# Patient Record
Sex: FEMALE | Race: WHITE | URBAN - NONMETROPOLITAN AREA
[De-identification: names, ages, dates, MRNs, and addresses within clinical notes are randomized per-mention and may not be internally consistent; named-entity substitution may affect disease eponyms.]

---

## 2021-07-20 ENCOUNTER — APPOINTMENT (OUTPATIENT)
Dept: CT IMAGING | Age: 56
End: 2021-07-20
Payer: COMMERCIAL

## 2021-07-20 ENCOUNTER — HOSPITAL ENCOUNTER (EMERGENCY)
Age: 56
Discharge: ANOTHER HOSPITAL | End: 2021-07-20
Attending: EMERGENCY MEDICINE
Payer: COMMERCIAL

## 2021-07-20 ENCOUNTER — APPOINTMENT (OUTPATIENT)
Dept: GENERAL RADIOLOGY | Age: 56
End: 2021-07-20
Payer: COMMERCIAL

## 2021-07-20 ENCOUNTER — HOSPITAL ENCOUNTER (INPATIENT)
Age: 56
LOS: 3 days | Discharge: HOME | DRG: 040 | End: 2021-07-23
Attending: INTERNAL MEDICINE | Admitting: STUDENT IN AN ORGANIZED HEALTH CARE EDUCATION/TRAINING PROGRAM
Payer: COMMERCIAL

## 2021-07-20 VITALS
HEART RATE: 82 BPM | SYSTOLIC BLOOD PRESSURE: 104 MMHG | RESPIRATION RATE: 14 BRPM | TEMPERATURE: 98.9 F | DIASTOLIC BLOOD PRESSURE: 75 MMHG | OXYGEN SATURATION: 95 %

## 2021-07-20 DIAGNOSIS — R41.82 ALTERED MENTAL STATUS, UNSPECIFIED ALTERED MENTAL STATUS TYPE: Primary | ICD-10-CM

## 2021-07-20 DIAGNOSIS — R09.02 HYPOXIA: ICD-10-CM

## 2021-07-20 DIAGNOSIS — R77.8 ELEVATED TROPONIN: ICD-10-CM

## 2021-07-20 PROBLEM — G45.9 TIA (TRANSIENT ISCHEMIC ATTACK): Status: ACTIVE | Noted: 2021-07-20

## 2021-07-20 LAB
ABSOLUTE EOS #: 0 K/UL (ref 0–0.44)
ABSOLUTE IMMATURE GRANULOCYTE: 0 K/UL (ref 0–0.3)
ABSOLUTE LYMPH #: 1.25 K/UL (ref 1.1–3.7)
ABSOLUTE MONO #: 1.63 K/UL (ref 0.1–1.2)
ACETAMINOPHEN LEVEL: <5 UG/ML (ref 10–30)
ANION GAP SERPL CALCULATED.3IONS-SCNC: 12 MMOL/L (ref 9–17)
BASOPHILS # BLD: 0 % (ref 0–2)
BASOPHILS ABSOLUTE: 0 K/UL (ref 0–0.2)
BNP INTERPRETATION: ABNORMAL
BUN BLDV-MCNC: 24 MG/DL (ref 6–20)
BUN/CREAT BLD: 21 (ref 9–20)
CALCIUM SERPL-MCNC: 8.6 MG/DL (ref 8.6–10.4)
CHLORIDE BLD-SCNC: 101 MMOL/L (ref 98–107)
CO2: 26 MMOL/L (ref 20–31)
CREAT SERPL-MCNC: 1.14 MG/DL (ref 0.5–0.9)
DIFFERENTIAL TYPE: ABNORMAL
EOSINOPHILS RELATIVE PERCENT: 0 % (ref 1–4)
ETHANOL PERCENT: <0.01 %
ETHANOL: <10 MG/DL
GFR AFRICAN AMERICAN: 60 ML/MIN
GFR NON-AFRICAN AMERICAN: 49 ML/MIN
GFR SERPL CREATININE-BSD FRML MDRD: ABNORMAL ML/MIN/{1.73_M2}
GFR SERPL CREATININE-BSD FRML MDRD: ABNORMAL ML/MIN/{1.73_M2}
GLUCOSE BLD-MCNC: 129 MG/DL (ref 70–99)
HCT VFR BLD CALC: 38.9 % (ref 36.3–47.1)
HEMOGLOBIN: 12.5 G/DL (ref 11.9–15.1)
IMMATURE GRANULOCYTES: 0 %
INR BLD: 1.2
LACTIC ACID, WHOLE BLOOD: NORMAL MMOL/L (ref 0.7–2.1)
LACTIC ACID: 1.3 MMOL/L (ref 0.5–2.2)
LYMPHOCYTES # BLD: 10 % (ref 24–43)
MCH RBC QN AUTO: 30.2 PG (ref 25.2–33.5)
MCHC RBC AUTO-ENTMCNC: 32.1 G/DL (ref 28.4–34.8)
MCV RBC AUTO: 94 FL (ref 82.6–102.9)
MONOCYTES # BLD: 13 % (ref 3–12)
MORPHOLOGY: NORMAL
NRBC AUTOMATED: 0 PER 100 WBC
PDW BLD-RTO: 13.7 % (ref 11.8–14.4)
PLATELET # BLD: 227 K/UL (ref 138–453)
PLATELET ESTIMATE: ABNORMAL
PMV BLD AUTO: 9.4 FL (ref 8.1–13.5)
POTASSIUM SERPL-SCNC: 4.5 MMOL/L (ref 3.7–5.3)
PRO-BNP: 858 PG/ML
PROCALCITONIN: 0.11 NG/ML
PROTHROMBIN TIME: 15.2 SEC (ref 11.5–14.2)
RBC # BLD: 4.14 M/UL (ref 3.95–5.11)
RBC # BLD: ABNORMAL 10*6/UL
SALICYLATE LEVEL: <1 MG/DL (ref 3–10)
SEG NEUTROPHILS: 77 % (ref 36–65)
SEGMENTED NEUTROPHILS ABSOLUTE COUNT: 9.62 K/UL (ref 1.5–8.1)
SODIUM BLD-SCNC: 139 MMOL/L (ref 135–144)
TOXIC TRICYCLIC SC,BLOOD: NEGATIVE
TROPONIN INTERP: ABNORMAL
TROPONIN INTERP: ABNORMAL
TROPONIN T: ABNORMAL NG/ML
TROPONIN T: ABNORMAL NG/ML
TROPONIN, HIGH SENSITIVITY: 49 NG/L (ref 0–14)
TROPONIN, HIGH SENSITIVITY: 49 NG/L (ref 0–14)
TSH SERPL DL<=0.05 MIU/L-ACNC: 0.9 MIU/L (ref 0.3–5)
WBC # BLD: 12.5 K/UL (ref 3.5–11.3)
WBC # BLD: ABNORMAL 10*3/UL

## 2021-07-20 PROCEDURE — 80048 BASIC METABOLIC PNL TOTAL CA: CPT

## 2021-07-20 PROCEDURE — 80143 DRUG ASSAY ACETAMINOPHEN: CPT

## 2021-07-20 PROCEDURE — 70496 CT ANGIOGRAPHY HEAD: CPT

## 2021-07-20 PROCEDURE — 85025 COMPLETE CBC W/AUTO DIFF WBC: CPT

## 2021-07-20 PROCEDURE — 71260 CT THORAX DX C+: CPT

## 2021-07-20 PROCEDURE — 84443 ASSAY THYROID STIM HORMONE: CPT

## 2021-07-20 PROCEDURE — 83605 ASSAY OF LACTIC ACID: CPT

## 2021-07-20 PROCEDURE — 87040 BLOOD CULTURE FOR BACTERIA: CPT

## 2021-07-20 PROCEDURE — 99283 EMERGENCY DEPT VISIT LOW MDM: CPT

## 2021-07-20 PROCEDURE — 70450 CT HEAD/BRAIN W/O DYE: CPT

## 2021-07-20 PROCEDURE — 6360000004 HC RX CONTRAST MEDICATION: Performed by: EMERGENCY MEDICINE

## 2021-07-20 PROCEDURE — 83880 ASSAY OF NATRIURETIC PEPTIDE: CPT

## 2021-07-20 PROCEDURE — 84145 PROCALCITONIN (PCT): CPT

## 2021-07-20 PROCEDURE — 84484 ASSAY OF TROPONIN QUANT: CPT

## 2021-07-20 PROCEDURE — 36415 COLL VENOUS BLD VENIPUNCTURE: CPT

## 2021-07-20 PROCEDURE — 96374 THER/PROPH/DIAG INJ IV PUSH: CPT

## 2021-07-20 PROCEDURE — 6360000002 HC RX W HCPCS: Performed by: EMERGENCY MEDICINE

## 2021-07-20 PROCEDURE — G0426 INPT/ED TELECONSULT50: HCPCS | Performed by: PSYCHIATRY & NEUROLOGY

## 2021-07-20 PROCEDURE — 80179 DRUG ASSAY SALICYLATE: CPT

## 2021-07-20 PROCEDURE — 80307 DRUG TEST PRSMV CHEM ANLYZR: CPT

## 2021-07-20 PROCEDURE — 71045 X-RAY EXAM CHEST 1 VIEW: CPT

## 2021-07-20 PROCEDURE — 85610 PROTHROMBIN TIME: CPT

## 2021-07-20 PROCEDURE — 2060000000 HC ICU INTERMEDIATE R&B

## 2021-07-20 PROCEDURE — G0480 DRUG TEST DEF 1-7 CLASSES: HCPCS

## 2021-07-20 PROCEDURE — 93005 ELECTROCARDIOGRAM TRACING: CPT | Performed by: EMERGENCY MEDICINE

## 2021-07-20 PROCEDURE — 96375 TX/PRO/DX INJ NEW DRUG ADDON: CPT

## 2021-07-20 PROCEDURE — 2580000003 HC RX 258: Performed by: EMERGENCY MEDICINE

## 2021-07-20 RX ORDER — LANOLIN ALCOHOL/MO/W.PET/CERES
325 CREAM (GRAM) TOPICAL
Status: DISCONTINUED | OUTPATIENT
Start: 2021-07-21 | End: 2021-07-23 | Stop reason: HOSPADM

## 2021-07-20 RX ORDER — FLUTICASONE PROPIONATE 50 MCG
1 SPRAY, SUSPENSION (ML) NASAL DAILY
COMMUNITY

## 2021-07-20 RX ORDER — NALOXONE HYDROCHLORIDE 1 MG/ML
2 INJECTION INTRAMUSCULAR; INTRAVENOUS; SUBCUTANEOUS ONCE
Status: COMPLETED | OUTPATIENT
Start: 2021-07-20 | End: 2021-07-20

## 2021-07-20 RX ORDER — SODIUM CHLORIDE 9 MG/ML
25 INJECTION, SOLUTION INTRAVENOUS PRN
Status: DISCONTINUED | OUTPATIENT
Start: 2021-07-20 | End: 2021-07-23 | Stop reason: HOSPADM

## 2021-07-20 RX ORDER — ONDANSETRON 4 MG/1
4 TABLET, ORALLY DISINTEGRATING ORAL EVERY 8 HOURS PRN
Status: DISCONTINUED | OUTPATIENT
Start: 2021-07-20 | End: 2021-07-23 | Stop reason: HOSPADM

## 2021-07-20 RX ORDER — TRAZODONE HYDROCHLORIDE 100 MG/1
100 TABLET ORAL NIGHTLY
COMMUNITY

## 2021-07-20 RX ORDER — ATORVASTATIN CALCIUM 40 MG/1
40 TABLET, FILM COATED ORAL NIGHTLY
Status: DISCONTINUED | OUTPATIENT
Start: 2021-07-21 | End: 2021-07-23 | Stop reason: HOSPADM

## 2021-07-20 RX ORDER — ONDANSETRON 2 MG/ML
4 INJECTION INTRAMUSCULAR; INTRAVENOUS EVERY 6 HOURS PRN
Status: DISCONTINUED | OUTPATIENT
Start: 2021-07-20 | End: 2021-07-23 | Stop reason: HOSPADM

## 2021-07-20 RX ORDER — CITALOPRAM 10 MG/1
10 TABLET ORAL DAILY
COMMUNITY

## 2021-07-20 RX ORDER — BUSPIRONE HYDROCHLORIDE 30 MG/1
30 TABLET ORAL DAILY
COMMUNITY

## 2021-07-20 RX ORDER — OXYCODONE HYDROCHLORIDE AND ACETAMINOPHEN 5; 325 MG/1; MG/1
1 TABLET ORAL EVERY 4 HOURS PRN
COMMUNITY

## 2021-07-20 RX ORDER — MULTIVIT WITH MINERALS/LUTEIN
250 TABLET ORAL DAILY
Status: DISCONTINUED | OUTPATIENT
Start: 2021-07-21 | End: 2021-07-23 | Stop reason: HOSPADM

## 2021-07-20 RX ORDER — FERROUS SULFATE 325(65) MG
325 TABLET ORAL
COMMUNITY

## 2021-07-20 RX ORDER — MULTIVIT WITH MINERALS/LUTEIN
250 TABLET ORAL DAILY
COMMUNITY

## 2021-07-20 RX ORDER — SODIUM CHLORIDE 0.9 % (FLUSH) 0.9 %
10 SYRINGE (ML) INJECTION PRN
Status: DISCONTINUED | OUTPATIENT
Start: 2021-07-20 | End: 2021-07-23 | Stop reason: HOSPADM

## 2021-07-20 RX ORDER — NALOXONE HYDROCHLORIDE 1 MG/ML
INJECTION INTRAMUSCULAR; INTRAVENOUS; SUBCUTANEOUS
Status: DISCONTINUED
Start: 2021-07-20 | End: 2021-07-20 | Stop reason: HOSPADM

## 2021-07-20 RX ORDER — METHYLPHENIDATE HYDROCHLORIDE 10 MG/1
20 TABLET ORAL 2 TIMES DAILY
COMMUNITY

## 2021-07-20 RX ORDER — ONDANSETRON 2 MG/ML
4 INJECTION INTRAMUSCULAR; INTRAVENOUS ONCE
Status: COMPLETED | OUTPATIENT
Start: 2021-07-20 | End: 2021-07-20

## 2021-07-20 RX ORDER — SODIUM CHLORIDE 0.9 % (FLUSH) 0.9 %
10 SYRINGE (ML) INJECTION EVERY 12 HOURS SCHEDULED
Status: DISCONTINUED | OUTPATIENT
Start: 2021-07-21 | End: 2021-07-23 | Stop reason: HOSPADM

## 2021-07-20 RX ORDER — ASPIRIN 81 MG/1
81 TABLET ORAL DAILY
Status: DISCONTINUED | OUTPATIENT
Start: 2021-07-21 | End: 2021-07-23 | Stop reason: HOSPADM

## 2021-07-20 RX ORDER — 0.9 % SODIUM CHLORIDE 0.9 %
1000 INTRAVENOUS SOLUTION INTRAVENOUS ONCE
Status: COMPLETED | OUTPATIENT
Start: 2021-07-20 | End: 2021-07-20

## 2021-07-20 RX ORDER — FLUTICASONE PROPIONATE 50 MCG
1 SPRAY, SUSPENSION (ML) NASAL DAILY
Status: DISCONTINUED | OUTPATIENT
Start: 2021-07-21 | End: 2021-07-23 | Stop reason: HOSPADM

## 2021-07-20 RX ORDER — ASPIRIN 81 MG/1
81 TABLET ORAL DAILY
COMMUNITY

## 2021-07-20 RX ORDER — TRAZODONE HYDROCHLORIDE 100 MG/1
100 TABLET ORAL NIGHTLY
Status: DISCONTINUED | OUTPATIENT
Start: 2021-07-21 | End: 2021-07-23 | Stop reason: HOSPADM

## 2021-07-20 RX ADMIN — NALOXONE HYDROCHLORIDE 2 MG: 1 INJECTION PARENTERAL at 15:38

## 2021-07-20 RX ADMIN — IOPAMIDOL 75 ML: 755 INJECTION, SOLUTION INTRAVENOUS at 16:00

## 2021-07-20 RX ADMIN — SODIUM CHLORIDE 1000 ML: 9 INJECTION, SOLUTION INTRAVENOUS at 17:37

## 2021-07-20 RX ADMIN — IOPAMIDOL 75 ML: 755 INJECTION, SOLUTION INTRAVENOUS at 17:57

## 2021-07-20 RX ADMIN — ONDANSETRON 4 MG: 2 INJECTION INTRAMUSCULAR; INTRAVENOUS at 17:37

## 2021-07-20 ASSESSMENT — PAIN SCALES - GENERAL: PAINLEVEL_OUTOF10: 0

## 2021-07-20 NOTE — ED NOTES
Bed: 04  Expected date:   Expected time:   Means of arrival:   Comments:  53 fe overdose on pain meds     Neisha Dejesus  07/20/21 1538

## 2021-07-20 NOTE — ED PROVIDER NOTES
[facility] ED  Emergency Department        Pt Name: Charmaine Feng  MRN: [id number]  [place] 1965  Date of evaluation: 7/20/21    CHIEF COMPLAINT       Chief Complaint   Patient presents with    Altered Mental Status     pt has had an altered mental status at home, pt was given Narcan via EMS PTA. Pt had a recent knee scope        HISTORY OF PRESENT ILLNESS  (Location/Symptom, Timing/Onset, Context/Setting, Quality, Duration, ModifyingFactors, Severity.)      Charmaine Feng is a 64 y.o. female who presents withAMS      With family who is present at this time including  as well as patient's mother and father.  reports that patient was in her usual health last night when she went to bed. He checked on her around 4 AM and she was at her baseline. He did say goodbye to her this morning around 10:30 AM and she just responded okay. Her mother heard some noises just prior to bringing her to the emergency room and went to check on her and found that she was altered. And confused. EMS was called. In route they were concerned that she was pinpoint pupils patient was awake and talking they administered 2 mg of Narcan and felt that she then improved as far as giving her them more information. Upon arrival patient is altered. Noted to be hypoxic. Also having some right sided sensory deficits and right-sided weakness. And disorientation. Oriented only to self but not to place or time. Per family patient has a history of episodes of disorientation possible Lyme disease has been following with an autoimmune specialist.  A stroke alert was called upon arrival.  Last known well was around 4 AM this morning. Patient not a TPA candidate doing outside the window. She had a knee arthroscopy yesterday has been taking oxycodone as it has been prescribed and has not been taking it more than her usual amount.     PAST MEDICAL / SURGICAL / SOCIAL / FAMILY HISTORY      has a past medical history of Lyme disease. has a past surgical history that includes Knee arthroscopy (Right). Social History     Socioeconomic History    Marital status:      Spouse name: Not on file    Number of children: Not on file    Years of education: Not on file    Highest education level: Not on file   Occupational History    Not on file   Tobacco Use    Smoking status: Never Smoker    Smokeless tobacco: Never Used   Substance and Sexual Activity    Alcohol use: Not on file    Drug use: Not on file    Sexual activity: Not on file   Other Topics Concern    Not on file   Social History Narrative    Not on file     Social Determinants of Health     Financial Resource Strain:     Difficulty of Paying Living Expenses:    Food Insecurity:     Worried About Running Out of Food in the Last Year:     [address] in the Last Year:    Transportation Needs:     Lack of Transportation (Medical):  Lack of Transportation (Non-Medical):    Physical Activity:     Days of Exercise per Week:     Minutes of Exercise per Session:    Stress:     Feeling of Stress :    Social Connections:     Frequency of Communication with Friends and Family:     Frequency of Social Gatherings with Friends and Family:     Attends Protestant Services:     Active Member of Clubs or Organizations:     Attends Club or Organization Meetings:     Marital Status:    Intimate Partner Violence:     Fear of Current or Ex-Partner:     Emotionally Abused:     Physically Abused:     Sexually Abused:        Family History   Problem Relation Age of Onset    Cancer Mother     Cancer Father        Allergies:  Patient has no known allergies. Home Medications:  Prior to Admission medications    Medication Sig Start Date End Date Taking?  Authorizing Provider   aspirin 81 MG EC tablet Take 81 mg by mouth daily   Yes Historical Provider, MD   fluticasone (FLONASE) 50 MCG/ACT nasal spray 1 spray by Each Nostril route daily   Yes Historical Provider, MD   ferrous sulfate (IRON 325) 325 (65 Fe) MG tablet Take 325 mg by mouth daily (with breakfast)   Yes Historical Provider, MD   Ascorbic Acid (VITAMIN C) 250 MG tablet Take 250 mg by mouth daily   Yes Historical Provider, MD   methylphenidate (RITALIN) 10 MG tablet Take 20 mg by mouth 2 times daily. Yes Historical Provider, MD   busPIRone (BUSPAR) 30 MG tablet Take 30 mg by mouth daily   Yes Historical Provider, MD   citalopram (CELEXA) 10 MG tablet Take 10 mg by mouth daily   Yes Historical Provider, MD   traZODone (DESYREL) 100 MG tablet Take 100 mg by mouth nightly   Yes Historical Provider, MD   oxyCODONE-acetaminophen (PERCOCET) 5-325 MG per tablet Take 1 tablet by mouth every 4 hours as needed for Pain. Yes Historical Provider, MD       REVIEW OF SYSTEMS    (2-9 systems for level 4, 10 or more for level 5)      Review of Systems   Unable to perform ROS: Mental status change       PHYSICAL EXAM   (up to 7 for level 4, 8 or more for level 5)     INITIAL VITALS:   /75   Pulse 82   Temp 98.9 °F (37.2 °C) (Tympanic)   Resp 14   SpO2 95%     Physical Exam  Constitutional:       General: She is not in acute distress. Appearance: Normal appearance. She is not ill-appearing. Comments: Patient on exam with difficult word finding. She does have some decreased sensation to her right arm, as well as decrease movement of her right leg. She does have a surgical scar from a arthroscopy to her right knee without any erythema drainage or discharge. HENT:      Head: Normocephalic and atraumatic. Eyes:      General:         Right eye: No discharge. Left eye: No discharge. Extraocular Movements: Extraocular movements intact. Pupils: Pupils are equal, round, and reactive to light. Cardiovascular:      Rate and Rhythm: Normal rate. Pulmonary:      Effort: Pulmonary effort is normal. No respiratory distress. Musculoskeletal:      Right lower leg: No edema.       Left lower leg: No edema. Neurological:      General: No focal deficit present. Mental Status: She is alert and oriented to person, place, and time. Cranial Nerves: No cranial nerve deficit. Coordination: Coordination abnormal.      Comments: Patient also with abnormal finger-nose motion on her right side         DIFFERENTIAL  DIAGNOSIS     With altered mental status, right size did neuro deficits NIH initially calculated at 5. Reassessment patient is moving her arm and having improvement in her sensorium. Per EMS report patient's last known well was last night. And was found by family after waking her this morning. Patient still having expressive aphasia. And unable to name multiple objects. NIH Stroke Scale  NIH Stroke Scale Assessed: Yes  Interval: Baseline  Level of Consciousness (1a. ): Alert  LOC Questions (1b. ):  Answers one correctly  LOC Commands (1c. ): Performs both tasks correctly  Best Gaze (2. ): Normal  Visual (3. ): No visual loss  Facial Palsy (4. ): Normal symmetrical movement  Motor Arm, Left (5a. ): No drift  Motor Arm, Right (5b. ): No drift  Motor Leg, Left (6a. ): No drift  Motor Leg, Right (6b. ): Some effort against gravity  Limb Ataxia (7. ): (!) Present in one limb  Sensory (8. ): (!) Mild to Moderate  Best Language (9. ): No aphasia  Dysarthria (10. ): Normal  Extinction and Inattention (11): No abnormality  Total: 5                     PLAN (LABS / IMAGING / EKG):  Orders Placed This Encounter   Procedures    Culture, Blood 1    Culture, Blood 1    CT Head WO Contrast    CTA HEAD NECK W CONTRAST    XR CHEST PORTABLE    CT CHEST PULMONARY EMBOLISM W CONTRAST    Basic Metabolic Panel    CBC Auto Differential    TOX SCR, BLD, ED    Protime-INR    Troponin    Lactic acid, plasma    Procalcitonin    Troponin    TSH without Reflex    Brain Natriuretic Peptide    EKG 12 Lead    Insert peripheral IV       MEDICATIONS ORDERED:  Orders Placed This Encounter Medications    naloxone (NARCAN) injection 2 mg    DISCONTD: naloxone (NARCAN) 2 MG/2ML injection     Mccormack Chard: cabinet override    iopamidol (ISOVUE-370) 76 % injection 75 mL    0.9 % sodium chloride bolus    ondansetron (ZOFRAN) injection 4 mg    iopamidol (ISOVUE-370) 76 % injection 75 mL       DIAGNOSTIC RESULTS / EMERGENCY DEPARTMENT COURSE / MDM     LABS:  Results for orders placed or performed during the hospital encounter of 07/20/21   Culture, Blood 1    Specimen: Blood   Result Value Ref Range    Specimen Description . BLOOD     Special Requests 20ML RH     Culture NO GROWTH 14 HOURS    Culture, Blood 1    Specimen: Blood   Result Value Ref Range    Specimen Description . BLOOD     Special Requests LH 12ML     Culture NO GROWTH 14 HOURS    Basic Metabolic Panel   Result Value Ref Range    Glucose 129 (H) 70 - 99 mg/dL    BUN 24 (H) 6 - 20 mg/dL    CREATININE 1.14 (H) 0.50 - 0.90 mg/dL    Bun/Cre Ratio 21 (H) 9 - 20    Calcium 8.6 8.6 - 10.4 mg/dL    Sodium 139 135 - 144 mmol/L    Potassium 4.5 3.7 - 5.3 mmol/L    Chloride 101 98 - 107 mmol/L    CO2 26 20 - 31 mmol/L    Anion Gap 12 9 - 17 mmol/L    GFR Non-African American 49 (L) >60 mL/min    GFR African American 60 (L) >60 mL/min    GFR Comment          GFR Staging         CBC Auto Differential   Result Value Ref Range    WBC 12.5 (H) 3.5 - 11.3 k/uL    RBC 4.14 3.95 - 5.11 m/uL    Hemoglobin 12.5 11.9 - 15.1 g/dL    Hematocrit 38.9 36.3 - 47.1 %    MCV 94.0 82.6 - 102.9 fL    MCH 30.2 25.2 - 33.5 pg    MCHC 32.1 28.4 - 34.8 g/dL    RDW 13.7 11.8 - 14.4 %    Platelets 635 368 - 921 k/uL    MPV 9.4 8.1 - 13.5 fL    NRBC Automated 0.0 0.0 per 100 WBC    Differential Type NOT REPORTED     WBC Morphology NOT REPORTED     RBC Morphology NOT REPORTED     Platelet Estimate NOT REPORTED     Seg Neutrophils 77 (H) 36 - 65 %    Lymphocytes 10 (L) 24 - 43 %    Monocytes 13 (H) 3 - 12 %    Eosinophils % 0 (L) 1 - 4 %    Immature Granulocytes 0 0 % Basophils 0 0 - 2 %    Segs Absolute 9.62 (H) 1.50 - 8.10 k/uL    Absolute Lymph # 1.25 1.10 - 3.70 k/uL    Absolute Mono # 1.63 (H) 0.10 - 1.20 k/uL    Absolute Eos # 0.00 0.00 - 0.44 k/uL    Absolute Immature Granulocyte 0.00 0.00 - 0.30 k/uL    Basophils Absolute 0.00 0.0 - 0.2 k/uL    Morphology Normal    TOX SCR, BLD, ED   Result Value Ref Range    Acetaminophen Level <5 (L) 10 - 30 ug/mL    Ethanol <10 <10 mg/dL    Ethanol percent <8.699 <1.985 %    Salicylate Lvl <1 (L) 3 - 10 mg/dL    Toxic Tricyclic Sc,Blood NEGATIVE NEGATIVE   Protime-INR   Result Value Ref Range    Protime 15.2 (H) 11.5 - 14.2 sec    INR 1.2    Troponin   Result Value Ref Range    Troponin, High Sensitivity 49 (H) 0 - 14 ng/L    Troponin T NOT REPORTED <0.03 ng/mL    Troponin Interp NOT REPORTED    Lactic acid, plasma   Result Value Ref Range    Lactic Acid 1.3 0.5 - 2.2 mmol/L    Lactic Acid, Whole Blood NOT REPORTED 0.7 - 2.1 mmol/L   Procalcitonin   Result Value Ref Range    Procalcitonin 0.11 (H) <0.09 ng/mL   Troponin   Result Value Ref Range    Troponin, High Sensitivity 49 (H) 0 - 14 ng/L    Troponin T NOT REPORTED <0.03 ng/mL    Troponin Interp NOT REPORTED    TSH without Reflex   Result Value Ref Range    TSH 0.90 0.30 - 5.00 mIU/L   Brain Natriuretic Peptide   Result Value Ref Range    Pro- (H) <300 pg/mL    BNP Interpretation Pro-BNP Reference Range:    EKG 12 Lead   Result Value Ref Range    Ventricular Rate 88 BPM    Atrial Rate 88 BPM    P-R Interval 164 ms    QRS Duration 96 ms    Q-T Interval 366 ms    QTc Calculation (Bazett) 442 ms    P Axis 48 degrees    R Axis -7 degrees    T Axis 27 degrees       IMPRESSION: AMS< CVA?, hypoxia    RADIOLOGY:  CT CHEST PULMONARY EMBOLISM W CONTRAST   Final Result   No evidence of pulmonary embolism or acute pulmonary abnormality. Thyroid nodules, amenable to further evaluation by ultrasound. Marked hepatic steatosis.          XR CHEST PORTABLE   Final Result   No acute process. CTA HEAD NECK W CONTRAST   Final Result   1. No evidence of large vessel occlusion, significant stenosis, dissection,   or aneurysmal dilation. 2. Incidentally noted predominately left-sided enlarged heterogeneous thyroid   which may represent multinodular goiter. Recommend correlation with thyroid   ultrasound. CT Head WO Contrast   Final Result   No acute intracranial abnormality. Findings discussed via telephone with Dr. Belen Rangel on 07/20/2021 at 4:05 p.m. EKG: No STEMI noted    POC ULTRASOUND:  *    EMERGENCY DEPARTMENT COURSE:  Spoke with Dr. Willie Mina,  And recommends check Septic labs, and Get CTA, and MRI if negative  Not TPA candidate due to unclear timeline. Spoke with cardiology with Dr. Anabela Wood given elevate trop, and plan to trend, ctc did not show pe and no heparinto startm spoke with dai with [facility] and patient transferred to [facility], family is in agreement with plan        FINAL IMPRESSION      1. Altered mental status, unspecified altered mental status type    2. Elevated troponin    3. Hypoxia          DISPOSITION / PLAN     DISPOSITION Decision To Transfer 07/20/2021 03:46:18 PM      PATIENT REFERRED TO:  No follow-up provider specified.     DISCHARGE MEDICATIONS:  Discharge Medication List as of 7/20/2021  9:55 PM          Lowell Moreno DO  2:13 PM    Attending Emergency Physician  [address] [place] [address] ED    (Please note that portions of this note were completed with a voice recognition program.  Effortswere made to edit the dictations but occasionally words are mis-transcribed.)              Claudia May,   07/21/21 2957

## 2021-07-20 NOTE — VIRTUAL HEALTH
aphasia  Dysarthria (10. ): Normal  Extinction and Inattention (11): No abnormality  Total: 5  Pre-Morbid mRS: 0    Imaging:  Images were personally reviewed with VIZ. AI used to review images including:  CT brain without contrast: nothing acute  CTA imaging: normal CTA    Assessment    64year old woman with right knee arthroscopy today, 3-4 months of on/off transient confusion, came in today with mild aphasia and disorientation. Recommendations:  1. NIH 2  2. Recommend Inpatient Neurology and Cardiology Consult for further assessment and evaluation   3.  consider . [id number]  4. This is not a tPA candidate as her last known time was yesterday  5. Pt troponin is elevated and she is desaturated and needs 2L to barely at the 90s%  6. It is unclear if this is a stroke, even if this is a stroke, this is a small stroke  7. Pt main issue now is elevated troponin and oxygen desaturation, I recommend pt come to [facility] and be admitted to the cardiac unit and have cardiologist consulted, neurology should also be consulted to follow up  8. Recommend repeating troponin, if still going up, ok to use heparin drip  9. Start aspirin 81mg daily for now        Discussed with ED Physician Dr Karla Quintero    At least 54 min of Telemedicine and time in conversation directly with ED staff and physician for the patient who is in imminent and life threatening deterioration without further treatment and evaluation. This Virtual Visit was conducted with patient's (and/or legal guardian's) consent, to provide telestroke consultation and necessary medical care.   Time spent examining patient, reviewing the images personally, reviewing the chart, perform high complexity decision making and speaking with the nursing staff regarding recommendations        Phu Kruger MD,  Stroke, Neurocritical Care And/or [address] [place] [address] Stroke [address] [place] [address] [place]  Electronically signed 7/20/2021 at 4:06 PM

## 2021-07-20 NOTE — ED NOTES
Dr Osmani Hearn [address] called back via [id number] and connected with Dr Marianna Zapata  07/20/21 8274

## 2021-07-21 ENCOUNTER — APPOINTMENT (OUTPATIENT)
Dept: MRI IMAGING | Age: 56
DRG: 040 | End: 2021-07-21
Attending: INTERNAL MEDICINE
Payer: COMMERCIAL

## 2021-07-21 PROBLEM — K76.0 HEPATIC STEATOSIS: Status: ACTIVE | Noted: 2021-07-21

## 2021-07-21 PROBLEM — R50.9 FEVER, UNSPECIFIED: Status: ACTIVE | Noted: 2021-07-21

## 2021-07-21 PROBLEM — I63.89 PARIETAL LOBE INFARCTION (HCC): Status: ACTIVE | Noted: 2021-07-21

## 2021-07-21 PROBLEM — E04.2 MULTINODULAR GOITER: Status: ACTIVE | Noted: 2021-07-21

## 2021-07-21 PROBLEM — R47.01 RECEPTIVE APHASIA: Status: ACTIVE | Noted: 2021-07-21

## 2021-07-21 PROBLEM — G93.40 ENCEPHALOPATHY: Status: ACTIVE | Noted: 2021-07-21

## 2021-07-21 PROBLEM — E04.1 THYROID NODULE: Status: ACTIVE | Noted: 2021-07-21

## 2021-07-21 PROBLEM — F31.9 BIPOLAR DISORDER (HCC): Status: ACTIVE | Noted: 2021-07-21

## 2021-07-21 LAB
-: NORMAL
ALBUMIN SERPL-MCNC: 3.5 G/DL (ref 3.5–5.2)
ALBUMIN/GLOBULIN RATIO: 1.4 (ref 1–2.5)
ALP BLD-CCNC: 111 U/L (ref 35–104)
ALT SERPL-CCNC: 87 U/L (ref 5–33)
AMMONIA: 53 UMOL/L (ref 11–51)
AMORPHOUS: NORMAL
AMPHETAMINE SCREEN URINE: NEGATIVE
ANION GAP SERPL CALCULATED.3IONS-SCNC: 11 MMOL/L (ref 9–17)
AST SERPL-CCNC: 64 U/L
BACTERIA: NORMAL
BARBITURATE SCREEN URINE: NEGATIVE
BENZODIAZEPINE SCREEN, URINE: NEGATIVE
BILIRUB SERPL-MCNC: 0.41 MG/DL (ref 0.3–1.2)
BILIRUBIN URINE: NEGATIVE
BUN BLDV-MCNC: 17 MG/DL (ref 6–20)
BUN/CREAT BLD: ABNORMAL (ref 9–20)
BUPRENORPHINE URINE: ABNORMAL
C-REACTIVE PROTEIN: 71.7 MG/L (ref 0–5)
CALCIUM SERPL-MCNC: 8.4 MG/DL (ref 8.6–10.4)
CANNABINOID SCREEN URINE: NEGATIVE
CASTS UA: NORMAL /LPF (ref 0–8)
CHLORIDE BLD-SCNC: 102 MMOL/L (ref 98–107)
CHOLESTEROL/HDL RATIO: 3.2
CHOLESTEROL: 171 MG/DL
CO2: 23 MMOL/L (ref 20–31)
COCAINE METABOLITE, URINE: NEGATIVE
COLOR: YELLOW
COMMENT UA: ABNORMAL
CREAT SERPL-MCNC: 0.92 MG/DL (ref 0.5–0.9)
CRYSTALS, UA: NORMAL /HPF
EKG ATRIAL RATE: 88 BPM
EKG P AXIS: 48 DEGREES
EKG P-R INTERVAL: 164 MS
EKG Q-T INTERVAL: 366 MS
EKG QRS DURATION: 96 MS
EKG QTC CALCULATION (BAZETT): 442 MS
EKG R AXIS: -7 DEGREES
EKG T AXIS: 27 DEGREES
EKG VENTRICULAR RATE: 88 BPM
EPITHELIAL CELLS UA: NORMAL /HPF (ref 0–5)
ESTIMATED AVERAGE GLUCOSE: 126 MG/DL
GFR AFRICAN AMERICAN: >60 ML/MIN
GFR NON-AFRICAN AMERICAN: >60 ML/MIN
GFR SERPL CREATININE-BSD FRML MDRD: ABNORMAL ML/MIN/{1.73_M2}
GFR SERPL CREATININE-BSD FRML MDRD: ABNORMAL ML/MIN/{1.73_M2}
GLUCOSE BLD-MCNC: 102 MG/DL (ref 70–99)
GLUCOSE URINE: NEGATIVE
HBA1C MFR BLD: 6 % (ref 4–6)
HCT VFR BLD CALC: 35.6 % (ref 36.3–47.1)
HCT VFR BLD CALC: 36.6 % (ref 36.3–47.1)
HDLC SERPL-MCNC: 53 MG/DL
HEMOGLOBIN: 11 G/DL (ref 11.9–15.1)
HEMOGLOBIN: 11.2 G/DL (ref 11.9–15.1)
KETONES, URINE: NEGATIVE
LDL CHOLESTEROL: 97 MG/DL (ref 0–130)
LEUKOCYTE ESTERASE, URINE: NEGATIVE
MCH RBC QN AUTO: 29.3 PG (ref 25.2–33.5)
MCH RBC QN AUTO: 29.5 PG (ref 25.2–33.5)
MCHC RBC AUTO-ENTMCNC: 30.6 G/DL (ref 28.4–34.8)
MCHC RBC AUTO-ENTMCNC: 30.9 G/DL (ref 28.4–34.8)
MCV RBC AUTO: 95.4 FL (ref 82.6–102.9)
MCV RBC AUTO: 95.8 FL (ref 82.6–102.9)
MDMA URINE: ABNORMAL
METHADONE SCREEN, URINE: NEGATIVE
METHAMPHETAMINE, URINE: ABNORMAL
MUCUS: NORMAL
NITRITE, URINE: NEGATIVE
NRBC AUTOMATED: 0 PER 100 WBC
NRBC AUTOMATED: 0 PER 100 WBC
OPIATES, URINE: NEGATIVE
OTHER OBSERVATIONS UA: NORMAL
OXYCODONE SCREEN URINE: POSITIVE
PDW BLD-RTO: 13.7 % (ref 11.8–14.4)
PDW BLD-RTO: 13.7 % (ref 11.8–14.4)
PH UA: 5.5 (ref 5–8)
PHENCYCLIDINE, URINE: NEGATIVE
PLATELET # BLD: 231 K/UL (ref 138–453)
PLATELET # BLD: 235 K/UL (ref 138–453)
PMV BLD AUTO: 9.7 FL (ref 8.1–13.5)
PMV BLD AUTO: 9.7 FL (ref 8.1–13.5)
POTASSIUM SERPL-SCNC: 4.2 MMOL/L (ref 3.7–5.3)
PROCALCITONIN: 0.13 NG/ML
PROLACTIN: 8.85 UG/L (ref 4.79–23.3)
PROPOXYPHENE, URINE: ABNORMAL
PROTEIN UA: ABNORMAL
RBC # BLD: 3.73 M/UL (ref 3.95–5.11)
RBC # BLD: 3.82 M/UL (ref 3.95–5.11)
RBC UA: NORMAL /HPF (ref 0–4)
RENAL EPITHELIAL, UA: NORMAL /HPF
SEDIMENTATION RATE, ERYTHROCYTE: 19 MM (ref 0–30)
SODIUM BLD-SCNC: 136 MMOL/L (ref 135–144)
SPECIFIC GRAVITY UA: 1.07 (ref 1–1.03)
TEST INFORMATION: ABNORMAL
TOTAL PROTEIN: 6 G/DL (ref 6.4–8.3)
TRICHOMONAS: NORMAL
TRICYCLIC ANTIDEPRESSANTS, UR: ABNORMAL
TRIGL SERPL-MCNC: 103 MG/DL
TROPONIN INTERP: ABNORMAL
TROPONIN INTERP: ABNORMAL
TROPONIN T: ABNORMAL NG/ML
TROPONIN T: ABNORMAL NG/ML
TROPONIN, HIGH SENSITIVITY: 47 NG/L (ref 0–14)
TROPONIN, HIGH SENSITIVITY: 56 NG/L (ref 0–14)
TURBIDITY: CLEAR
URINE HGB: NEGATIVE
UROBILINOGEN, URINE: NORMAL
VLDLC SERPL CALC-MCNC: NORMAL MG/DL (ref 1–30)
WBC # BLD: 10.2 K/UL (ref 3.5–11.3)
WBC # BLD: 11.3 K/UL (ref 3.5–11.3)
WBC UA: NORMAL /HPF (ref 0–5)
YEAST: NORMAL

## 2021-07-21 PROCEDURE — 6370000000 HC RX 637 (ALT 250 FOR IP): Performed by: NURSE PRACTITIONER

## 2021-07-21 PROCEDURE — 86618 LYME DISEASE ANTIBODY: CPT

## 2021-07-21 PROCEDURE — 92523 SPEECH SOUND LANG COMPREHEN: CPT

## 2021-07-21 PROCEDURE — 85652 RBC SED RATE AUTOMATED: CPT

## 2021-07-21 PROCEDURE — 80307 DRUG TEST PRSMV CHEM ANLYZR: CPT

## 2021-07-21 PROCEDURE — 83036 HEMOGLOBIN GLYCOSYLATED A1C: CPT

## 2021-07-21 PROCEDURE — 99223 1ST HOSP IP/OBS HIGH 75: CPT | Performed by: STUDENT IN AN ORGANIZED HEALTH CARE EDUCATION/TRAINING PROGRAM

## 2021-07-21 PROCEDURE — 70553 MRI BRAIN STEM W/O & W/DYE: CPT

## 2021-07-21 PROCEDURE — 95819 EEG AWAKE AND ASLEEP: CPT | Performed by: PSYCHIATRY & NEUROLOGY

## 2021-07-21 PROCEDURE — 95816 EEG AWAKE AND DROWSY: CPT

## 2021-07-21 PROCEDURE — 93005 ELECTROCARDIOGRAM TRACING: CPT | Performed by: STUDENT IN AN ORGANIZED HEALTH CARE EDUCATION/TRAINING PROGRAM

## 2021-07-21 PROCEDURE — 81001 URINALYSIS AUTO W/SCOPE: CPT

## 2021-07-21 PROCEDURE — 6360000004 HC RX CONTRAST MEDICATION

## 2021-07-21 PROCEDURE — 80053 COMPREHEN METABOLIC PANEL: CPT

## 2021-07-21 PROCEDURE — 84484 ASSAY OF TROPONIN QUANT: CPT

## 2021-07-21 PROCEDURE — 36415 COLL VENOUS BLD VENIPUNCTURE: CPT

## 2021-07-21 PROCEDURE — 85027 COMPLETE CBC AUTOMATED: CPT

## 2021-07-21 PROCEDURE — 81241 F5 GENE: CPT

## 2021-07-21 PROCEDURE — 93010 ELECTROCARDIOGRAM REPORT: CPT | Performed by: INTERNAL MEDICINE

## 2021-07-21 PROCEDURE — APPSS60 APP SPLIT SHARED TIME 46-60 MINUTES: Performed by: NURSE PRACTITIONER

## 2021-07-21 PROCEDURE — 81240 F2 GENE: CPT

## 2021-07-21 PROCEDURE — 2060000000 HC ICU INTERMEDIATE R&B

## 2021-07-21 PROCEDURE — 84146 ASSAY OF PROLACTIN: CPT

## 2021-07-21 PROCEDURE — 2580000003 HC RX 258: Performed by: CLINICAL NURSE SPECIALIST

## 2021-07-21 PROCEDURE — A9579 GAD-BASE MR CONTRAST NOS,1ML: HCPCS

## 2021-07-21 PROCEDURE — 82140 ASSAY OF AMMONIA: CPT

## 2021-07-21 PROCEDURE — 6360000002 HC RX W HCPCS: Performed by: CLINICAL NURSE SPECIALIST

## 2021-07-21 PROCEDURE — 80061 LIPID PANEL: CPT

## 2021-07-21 PROCEDURE — 81291 MTHFR GENE: CPT

## 2021-07-21 PROCEDURE — 6370000000 HC RX 637 (ALT 250 FOR IP): Performed by: CLINICAL NURSE SPECIALIST

## 2021-07-21 PROCEDURE — 99254 IP/OBS CNSLTJ NEW/EST MOD 60: CPT | Performed by: PSYCHIATRY & NEUROLOGY

## 2021-07-21 PROCEDURE — 86147 CARDIOLIPIN ANTIBODY EA IG: CPT

## 2021-07-21 PROCEDURE — 86140 C-REACTIVE PROTEIN: CPT

## 2021-07-21 PROCEDURE — 84145 PROCALCITONIN (PCT): CPT

## 2021-07-21 RX ORDER — SODIUM CHLORIDE 0.9 % (FLUSH) 0.9 %
10 SYRINGE (ML) INJECTION PRN
Status: DISCONTINUED | OUTPATIENT
Start: 2021-07-21 | End: 2021-07-23 | Stop reason: HOSPADM

## 2021-07-21 RX ORDER — ACETAMINOPHEN 325 MG/1
650 TABLET ORAL EVERY 4 HOURS PRN
Status: DISCONTINUED | OUTPATIENT
Start: 2021-07-21 | End: 2021-07-23 | Stop reason: HOSPADM

## 2021-07-21 RX ADMIN — FERROUS SULFATE TAB EC 325 MG (65 MG FE EQUIVALENT) 325 MG: 325 (65 FE) TABLET DELAYED RESPONSE at 08:46

## 2021-07-21 RX ADMIN — GADOTERIDOL 19 ML: 279.3 INJECTION, SOLUTION INTRAVENOUS at 11:45

## 2021-07-21 RX ADMIN — ENOXAPARIN SODIUM 40 MG: 40 INJECTION SUBCUTANEOUS at 08:46

## 2021-07-21 RX ADMIN — DESMOPRESSIN ACETATE 40 MG: 0.2 TABLET ORAL at 01:51

## 2021-07-21 RX ADMIN — ACETAMINOPHEN 650 MG: 325 TABLET ORAL at 20:40

## 2021-07-21 RX ADMIN — ACETAMINOPHEN 650 MG: 325 TABLET ORAL at 04:40

## 2021-07-21 RX ADMIN — Medication 81 MG: at 08:46

## 2021-07-21 RX ADMIN — Medication 250 MG: at 08:46

## 2021-07-21 RX ADMIN — SODIUM CHLORIDE, PRESERVATIVE FREE 10 ML: 5 INJECTION INTRAVENOUS at 08:47

## 2021-07-21 RX ADMIN — SODIUM CHLORIDE, PRESERVATIVE FREE 10 ML: 5 INJECTION INTRAVENOUS at 20:31

## 2021-07-21 RX ADMIN — FLUTICASONE PROPIONATE 1 SPRAY: 50 SPRAY, METERED NASAL at 08:48

## 2021-07-21 RX ADMIN — ACETAMINOPHEN 650 MG: 325 TABLET ORAL at 16:35

## 2021-07-21 RX ADMIN — SODIUM CHLORIDE, PRESERVATIVE FREE 10 ML: 5 INJECTION INTRAVENOUS at 08:49

## 2021-07-21 RX ADMIN — DESMOPRESSIN ACETATE 40 MG: 0.2 TABLET ORAL at 20:31

## 2021-07-21 ASSESSMENT — ENCOUNTER SYMPTOMS
SHORTNESS OF BREATH: 0
WHEEZING: 0
DIARRHEA: 0
BLOOD IN STOOL: 0
CONSTIPATION: 0
STRIDOR: 0
NAUSEA: 0
VOMITING: 0
ABDOMINAL PAIN: 0
COUGH: 0

## 2021-07-21 ASSESSMENT — PAIN SCALES - GENERAL
PAINLEVEL_OUTOF10: 3
PAINLEVEL_OUTOF10: 0

## 2021-07-21 ASSESSMENT — PAIN DESCRIPTION - LOCATION: LOCATION: HEAD;KNEE

## 2021-07-21 ASSESSMENT — PAIN DESCRIPTION - FREQUENCY: FREQUENCY: INTERMITTENT

## 2021-07-21 ASSESSMENT — PAIN DESCRIPTION - DESCRIPTORS: DESCRIPTORS: HEADACHE;ACHING

## 2021-07-21 ASSESSMENT — PAIN DESCRIPTION - PAIN TYPE: TYPE: ACUTE PAIN

## 2021-07-21 ASSESSMENT — PAIN DESCRIPTION - ORIENTATION: ORIENTATION: RIGHT

## 2021-07-21 NOTE — CONSULTS
Prior to Admission medications    Medication Sig Start Date End Date Taking? Authorizing Provider   aspirin 81 MG EC tablet Take 81 mg by mouth daily    Historical Provider, MD   fluticasone (FLONASE) 50 MCG/ACT nasal spray 1 spray by Each Nostril route daily    Historical Provider, MD   ferrous sulfate (IRON 325) 325 (65 Fe) MG tablet Take 325 mg by mouth daily (with breakfast)    Historical Provider, MD   Ascorbic Acid (VITAMIN C) 250 MG tablet Take 250 mg by mouth daily    Historical Provider, MD   methylphenidate (RITALIN) 10 MG tablet Take 20 mg by mouth 2 times daily. Historical Provider, MD   busPIRone (BUSPAR) 30 MG tablet Take 30 mg by mouth daily    Historical Provider, MD   citalopram (CELEXA) 10 MG tablet Take 10 mg by mouth daily    Historical Provider, MD   traZODone (DESYREL) 100 MG tablet Take 100 mg by mouth nightly    Historical Provider, MD   oxyCODONE-acetaminophen (PERCOCET) 5-325 MG per tablet Take 1 tablet by mouth every 4 hours as needed for Pain. Historical Provider, MD        Allergies:     Patient has no known allergies. Social History:     Tobacco:    reports that she has never smoked. She has never used smokeless tobacco.  Alcohol:      has no history on file for alcohol use. Drug Use:  has no history on file for drug use. Family History:     No family history on file.     Review of Systems:     ROS:  Constitutional  Negative for fever and chills    HEENT  Negative for ear discharge, ear pain, nosebleed    Eyes  Negative for photophobia, pain and discharge    Respiratory  Negative for hemoptysis and sputum    Cardiovascular  Negative for orthopnea, claudication and PND    Gastrointestinal  Negative for abdominal pain, diarrhea, blood in stool    Musculoskeletal  Negative for joint pain, negative for myalgia    Neurology Negative for seizures, loss of consciousness   Skin  Negative for rash or itching    Endo/heme/allergies  Negative for polydipsia, environmental allergy Psychiatric/behavioral  Negative for suicidal ideation. Patient is not anxious        Physical Exam:   /77   Pulse 81   Temp 99.4 °F (37.4 °C) (Oral)   Resp 12   Ht 5' 2\" (1.575 m)   Wt 212 lb 15.4 oz (96.6 kg)   SpO2 96%   BMI 38.95 kg/m²   Temp (24hrs), Av.2 °F (37.3 °C), Min:98.9 °F (37.2 °C), Max:99.4 °F (37.4 °C)    No results for input(s): POCGLU in the last 72 hours.   No intake or output data in the 24 hours ending 21 0154      NEUROLOGIC EXAMINATION  GENERAL  Appears comfortable and in no distress   HEENT  NC/ AT   NECK  Supple and no bruits heard   MENTAL STATUS:  Alert, oriented to person and age difficulty answering year, month and situation, speech is normal without dysarthria although noted to have receptive aphasia with paraphasic errors   CRANIAL NERVES: II     -      Visual fields intact to confrontation  III,IV,VI -  EOMs full, no afferent defect, no CHIQUITA, no ptosis  V     -     Normal facial sensation  VII    -     Normal facial symmetry  VIII   -     Intact hearing  IX,X -     Symmetrical palate  XI    -     Symmetrical shoulder shrug  XII   -     Midline tongue, no atrophy    MOTOR FUNCTION:  significant for good strength of grade 5/5 in bilateral proximal and distal muscle groups of both upper and lower extremities with normal bulk, normal tone and no involuntary movements, no tremor   SENSORY FUNCTION:  Normal touch, normal pin, normal vibration, normal proprioception   CEREBELLAR FUNCTION:  Intact fine motor control over upper limbs   REFLEX FUNCTION:  Symmetric, no perverted reflex, no Babinski sign   STATION and GAIT  Not tested       Investigations:      Laboratory Testing:  Recent Results (from the past 24 hour(s))   Basic Metabolic Panel    Collection Time: 21  4:24 PM   Result Value Ref Range    Glucose 129 (H) 70 - 99 mg/dL    BUN 24 (H) 6 - 20 mg/dL    CREATININE 1.14 (H) 0.50 - 0.90 mg/dL    Bun/Cre Ratio 21 (H) 9 - 20    Calcium 8.6 8.6 - 10.4 mg/dL Sodium 139 135 - 144 mmol/L    Potassium 4.5 3.7 - 5.3 mmol/L    Chloride 101 98 - 107 mmol/L    CO2 26 20 - 31 mmol/L    Anion Gap 12 9 - 17 mmol/L    GFR Non-African American 49 (L) >60 mL/min    GFR African American 60 (L) >60 mL/min    GFR Comment          GFR Staging         CBC Auto Differential    Collection Time: 07/20/21  4:24 PM   Result Value Ref Range    WBC 12.5 (H) 3.5 - 11.3 k/uL    RBC 4.14 3.95 - 5.11 m/uL    Hemoglobin 12.5 11.9 - 15.1 g/dL    Hematocrit 38.9 36.3 - 47.1 %    MCV 94.0 82.6 - 102.9 fL    MCH 30.2 25.2 - 33.5 pg    MCHC 32.1 28.4 - 34.8 g/dL    RDW 13.7 11.8 - 14.4 %    Platelets 460 243 - 418 k/uL    MPV 9.4 8.1 - 13.5 fL    NRBC Automated 0.0 0.0 per 100 WBC    Differential Type NOT REPORTED     WBC Morphology NOT REPORTED     RBC Morphology NOT REPORTED     Platelet Estimate NOT REPORTED     Seg Neutrophils 77 (H) 36 - 65 %    Lymphocytes 10 (L) 24 - 43 %    Monocytes 13 (H) 3 - 12 %    Eosinophils % 0 (L) 1 - 4 %    Immature Granulocytes 0 0 %    Basophils 0 0 - 2 %    Segs Absolute 9.62 (H) 1.50 - 8.10 k/uL    Absolute Lymph # 1.25 1.10 - 3.70 k/uL    Absolute Mono # 1.63 (H) 0.10 - 1.20 k/uL    Absolute Eos # 0.00 0.00 - 0.44 k/uL    Absolute Immature Granulocyte 0.00 0.00 - 0.30 k/uL    Basophils Absolute 0.00 0.0 - 0.2 k/uL    Morphology Normal    TOX SCR, BLD, ED    Collection Time: 07/20/21  4:24 PM   Result Value Ref Range    Acetaminophen Level <5 (L) 10 - 30 ug/mL    Ethanol <10 <10 mg/dL    Ethanol percent <5.748 <7.918 %    Salicylate Lvl <1 (L) 3 - 10 mg/dL    Toxic Tricyclic Sc,Blood NEGATIVE NEGATIVE   Protime-INR    Collection Time: 07/20/21  4:24 PM   Result Value Ref Range    Protime 15.2 (H) 11.5 - 14.2 sec    INR 1.2    Troponin    Collection Time: 07/20/21  4:24 PM   Result Value Ref Range    Troponin, High Sensitivity 49 (H) 0 - 14 ng/L    Troponin T NOT REPORTED <0.03 ng/mL    Troponin Interp NOT REPORTED    Lactic acid, plasma    Collection Time: 07/20/21 discussing the case with attending  The plan was discussed with the patient, patient's family and the medical staff. Patient is admitted as inpatient status because of co-morbidities listed above, severity of signs and symptoms as outlined, requirement for current medical therapies and most importantly because of direct risk to patient if care not provided in a hospital setting. Jose Miguel Gil MD   PGY-3 Neurology Resident   7/21/2021  1:54 AM    Copy sent to Dr. Fisher primary care provider on file.

## 2021-07-21 NOTE — CONSULTS
[facility]    Consult / H&P               Today's Date: 7/21/2021  Patient Name: Marlen Ring  Date of admission: 7/20/2021 11:29 PM  Patient's age: 64 y.o., 1965  Admission Dx: TIA (transient ischemic attack) [G45.9]    Reason for Consult:  Cardiac evaluation    Requesting Physician: Hue Gamboa MD    CHIEF COMPLAINT: Altered mental status    History Obtained From:  patient, electronic medical record    HISTORY OF PRESENT ILLNESS:      64 y.o.  female presented to the emergency department from outlying facility with concern of altered mental status and word difficulty following recent right knee arthroscopy concern for stroke. Cardiology is consulted because of elevated troponin. Patient denies any history of chest pain, lightheadedness, dizziness, syncope, fluttering sensation in the chest, fever, chills. EKG show normal sinus rhythm. Vitally her BP 99/63, on oxygen via nasal cannula. Labs are normal electrolytes, creatinine 1.14> 0.92, proBNP 858, troponin flat trend 49> 49> 56, mildly elevated liver enzyme, ammonia 53, TSH 0.90.   UDS is positive for oxycodone. H&H 11 and 35. 6. X-ray chest show no cardiopulmonary process    Past Medical History:   has a past medical history of Lyme disease. Past Surgical History:   has a past surgical history that includes Knee arthroscopy (Right). Home Medications:    Prior to Admission medications    Medication Sig Start Date End Date Taking?  Authorizing Provider   aspirin 81 MG EC tablet Take 81 mg by mouth daily    Historical Provider, MD   fluticasone (FLONASE) 50 MCG/ACT nasal spray 1 spray by Each Nostril route daily    Historical Provider, MD   ferrous sulfate (IRON 325) 325 (65 Fe) MG tablet Take 325 mg by mouth daily (with breakfast)    Historical Provider, MD   Ascorbic Acid (VITAMIN C) 250 MG tablet Take 250 mg by mouth daily    Historical Provider, MD   methylphenidate (RITALIN) 10 MG tablet Take 20 mg by mouth 2 times daily. Historical Provider, MD   busPIRone (BUSPAR) 30 MG tablet Take 30 mg by mouth daily    Historical Provider, MD   citalopram (CELEXA) 10 MG tablet Take 10 mg by mouth daily    Historical Provider, MD   traZODone (DESYREL) 100 MG tablet Take 100 mg by mouth nightly    Historical Provider, MD   oxyCODONE-acetaminophen (PERCOCET) 5-325 MG per tablet Take 1 tablet by mouth every 4 hours as needed for Pain. Historical Provider, MD      Current Facility-Administered Medications: acetaminophen (TYLENOL) tablet 650 mg, 650 mg, Oral, Q4H PRN  vitamin C tablet 250 mg, 250 mg, Oral, Daily  aspirin EC tablet 81 mg, 81 mg, Oral, Daily  ferrous sulfate (FE TABS 325) EC tablet 325 mg, 325 mg, Oral, Daily with breakfast  fluticasone (FLONASE) 50 MCG/ACT nasal spray 1 spray, 1 spray, Each Nostril, Daily  [Held by provider] traZODone (DESYREL) tablet 100 mg, 100 mg, Oral, Nightly  sodium chloride flush 0.9 % injection 10 mL, 10 mL, Intravenous, 2 times per day  sodium chloride flush 0.9 % injection 10 mL, 10 mL, Intravenous, PRN  0.9 % sodium chloride infusion, 25 mL, Intravenous, PRN  ondansetron (ZOFRAN-ODT) disintegrating tablet 4 mg, 4 mg, Oral, Q8H PRN **OR** ondansetron (ZOFRAN) injection 4 mg, 4 mg, Intravenous, Q6H PRN  magnesium hydroxide (MILK OF MAGNESIA) 400 MG/5ML suspension 30 mL, 30 mL, Oral, Daily PRN  enoxaparin (LOVENOX) injection 40 mg, 40 mg, Subcutaneous, Daily  atorvastatin (LIPITOR) tablet 40 mg, 40 mg, Oral, Nightly    Allergies:  Patient has no known allergies. Social History:   reports that she has never smoked. She has never used smokeless tobacco.     Family History: family history includes Cancer in her father and mother. REVIEW OF SYSTEMS:    · Constitutional: there has been no unanticipated weight loss. There's been No change in energy level, No change in activity level. · Eyes: No visual changes or diplopia. No scleral icterus.   · ENT: No Headaches  · Cardiovascular: No cardiac history  · Respiratory: No previous pulmonary problems, No cough  · Gastrointestinal: No abdominal pain. No change in bowel or bladder habits. · Genitourinary: No dysuria, trouble voiding, or hematuria. · Musculoskeletal:  No gait disturbance, No weakness or joint complaints. · Integumentary: No rash or pruritis. · Neurological: No headache, diplopia, change in muscle strength, numbness or tingling. No change in gait, balance, coordination, mood, affect, memory, mentation, behavior. · Psychiatric: No anxiety, or depression. · Endocrine: No temperature intolerance. No excessive thirst, fluid intake, or urination. No tremor. · Hematologic/Lymphatic: No abnormal bruising or bleeding, blood clots or swollen lymph nodes. · Allergic/Immunologic: No nasal congestion or hives. PHYSICAL EXAM:      BP 99/63   Pulse 84   Temp 97.9 °F (36.6 °C) (Oral)   Resp 19   Ht 5' 2\" (1.575 m)   Wt 212 lb 15.4 oz (96.6 kg)   SpO2 96%   BMI 38.95 kg/m²    Constitutional and General Appearance: alert, cooperative, no distress and appears stated age  HEENT: PERRL, no cervical lymphadenopathy. No masses palpable. Normal oral mucosa  Respiratory:  · Normal excursion and expansion without use of accessory muscles  · Resp Auscultation: Good respiratory effort. No for increased work of breathing. On auscultation: clear to auscultation bilaterally  Cardiovascular:  · The apical impulse is not displaced  · Heart tones are crisp and normal. regular S1 and S2.  · Jugular venous pulsation Normal  · The carotid upstroke is normal in amplitude and contour without delay or bruit  · Peripheral pulses are symmetrical and full   Abdomen:   · No masses or tenderness  · Bowel sounds present  Extremities:  ·  No Cyanosis or Clubbing  · Left lower leg swelling-due to recent arthroscopy  ·  Skin: Warm and dry  Neurological:  · Alert and oriented.   · Moves all extremities well  · No abnormalities of mood, affect, memory, mentation, or behavior are noted    DATA:    Diagnostics:    EKG: Normal sinus rhythm  ECHO: ordered, but not yet obtained. Stress Test: not obtained. Cardiac Angiography: not obtained. Labs:     CBC:   Recent Labs     07/21/21  0439 07/21/21  0630   WBC 10.2 11.3   HGB 11.2* 11.0*   HCT 36.6 35.6*    231     BMP:   Recent Labs     07/20/21  1624 07/21/21  0439    136   K 4.5 4.2   CO2 26 23   BUN 24* 17   CREATININE 1.14* 0.92*   LABGLOM 49* >60   GLUCOSE 129* 102*     PT/INR:   Recent Labs     07/20/21  1624   PROTIME 15.2*   INR 1.2     FASTING LIPID PANEL:  Lab Results   Component Value Date    HDL 53 07/21/2021    TRIG 103 07/21/2021     LIVER PROFILE:  Recent Labs     07/21/21  0439   AST 64*   ALT 87*   LABALBU 3.5       IMPRESSION:      1. Elevated troponin with flat trend 49> 49> 56. Likely due to demand mismatch, anemia, ISABELLA. 2. TIA  3. Thyroid nodule  4. History of recent arthroscopy    RECOMMENDATIONS:  1. Follow-up on echo. 2. Continue aspirin, Lipitor. 3. Will follow      Jatin Fernandez MD  Internal Medicine Resident  [facility]   7/21/2021, 9:16 AM      Attending Physician Statement  I have discussed the care of Carine Beatty, including pertinent history and exam findings,  with the cardiology fellow/resident. I have seen and examined the patient and the key elements of all parts of the encounter have been performed by me.      Pooja Mcmahon MD, [facility]

## 2021-07-21 NOTE — PLAN OF CARE
Problem: HEMODYNAMIC STATUS  Goal: Patient has stable vital signs and fluid balance  Outcome: Ongoing     Problem: ACTIVITY INTOLERANCE/IMPAIRED MOBILITY  Goal: Mobility/activity is maintained at optimum level for patient  Outcome: Ongoing     Problem: COMMUNICATION IMPAIRMENT  Goal: Ability to express needs and understand communication  Outcome: Ongoing   Neuro assessment completed, fall precautions in place, aspirations precautions in place, assess for barriers in communication and mobility, interventions to assist in communication and mobility in place, encouraged to call for assistance, adaptive devices used as needed, assess emotional state and support offered, encouraged patient to communicate by available means, and support systems included in patient care. Problem: Falls - Risk of:  Goal: Will remain free from falls  Description: Will remain free from falls  Outcome: Ongoing     Pt assessed as a fall risk this shift. Remains free from falls and accidental injury at this time. Fall precautions in place, including falling star sign. Floor free from obstacles, and bed is locked and in lowest position. Adequate lighting provided. Pt encouraged to call before getting Out Of Bed for any need. Will continue to monitor needs during hourly rounding, and reinforce education on use of call light.

## 2021-07-21 NOTE — H&P
the patient awake and talking, however they administered narcan and felt there was improvement in her verbal response and history telling,  Upon arrival to the emergency room, the patient was noted to be hypoxic with a right sided weakness with sensory deficit, disoriented and oriented to self. Stroke alert called. In review patient had a recent right knee arthroscopy the day prior and was on oxycodone which she states she was taking appropriate. She also has been following with a specialist for Lyme disease. Work up in the emergency room          Laboratories:   Metabolic panel. -Sodium 337, potassium 4.5, chloride 101, CO2 26, BUN 24, creatinine 1.14 anion gap 12. Glucose 129  Ethanol less than 10, normal  GI/Liver Panel-not done  Hematology.-Leukocytosis with a WBC 12.5, no anemias with a hemoglobin of 12.5 hematocrit 38.9 RDW 13.7 platelets 879  Coagulation-PT 15.2, INR 1.2,  Cardiac Markers-proBNP 858, high sensitive troponin 49  EKG-no data to review  Urine-not done      Imaging and Diagnostics:     CT Head WO Contrast    Result Date: 7/20/2021  No acute intracranial abnormality. Findings discussed via telephone with Dr. Guillermo William on 07/20/2021 at 4:05 p.m. XR CHEST PORTABLE    Result Date: 7/20/2021  No acute process. CT CHEST PULMONARY EMBOLISM W CONTRAST    Result Date: 7/20/2021  No evidence of pulmonary embolism or acute pulmonary abnormality. Thyroid nodules, amenable to further evaluation by ultrasound. Marked hepatic steatosis. CTA HEAD NECK W CONTRAST    Result Date: 7/20/2021  1. No evidence of large vessel occlusion, significant stenosis, dissection, or aneurysmal dilation. 2. Incidentally noted predominately left-sided enlarged heterogeneous thyroid which may represent multinodular goiter. Recommend correlation with thyroid ultrasound.            Past Medical History:     Past Medical History:   Diagnosis Date    Lyme disease         Past Surgical History:     No past surgical history on file. Medications Prior to Admission:     Prior to Admission medications    Medication Sig Start Date End Date Taking? Authorizing Provider   aspirin 81 MG EC tablet Take 81 mg by mouth daily    Historical Provider, MD   fluticasone (FLONASE) 50 MCG/ACT nasal spray 1 spray by Each Nostril route daily    Historical Provider, MD   ferrous sulfate (IRON 325) 325 (65 Fe) MG tablet Take 325 mg by mouth daily (with breakfast)    Historical Provider, MD   Ascorbic Acid (VITAMIN C) 250 MG tablet Take 250 mg by mouth daily    Historical Provider, MD   methylphenidate (RITALIN) 10 MG tablet Take 20 mg by mouth 2 times daily. Historical Provider, MD   busPIRone (BUSPAR) 30 MG tablet Take 30 mg by mouth daily    Historical Provider, MD   citalopram (CELEXA) 10 MG tablet Take 10 mg by mouth daily    Historical Provider, MD   traZODone (DESYREL) 100 MG tablet Take 100 mg by mouth nightly    Historical Provider, MD   oxyCODONE-acetaminophen (PERCOCET) 5-325 MG per tablet Take 1 tablet by mouth every 4 hours as needed for Pain. Historical Provider, MD        Allergies:     Patient has no known allergies. Social History:     Tobacco:    reports that she has never smoked. She has never used smokeless tobacco.  Alcohol:      has no history on file for alcohol use. Drug Use:  has no history on file for drug use. Family History:     Family History   Problem Relation Age of Onset    Cancer Mother     Cancer Father        Review of Systems:     Positive and Negative as described in HPI. Review of Systems   Constitutional: Positive for chills and fever. Negative for diaphoresis. HENT: Negative for congestion and hearing loss. Respiratory: Negative for cough, shortness of breath, wheezing and stridor. Cardiovascular: Negative for chest pain, palpitations and leg swelling. Gastrointestinal: Negative for abdominal pain, blood in stool, constipation, diarrhea, nausea and vomiting.    Genitourinary: Negative for dysuria and frequency. Musculoskeletal: Positive for arthralgias (knee ). Negative for myalgias. Skin: Negative for rash. Neurological: Negative for dizziness, seizures and headaches. Psychiatric/Behavioral: The patient is not nervous/anxious. Physical Exam:   /77   Pulse 81   Temp 99.4 °F (37.4 °C) (Oral)   Resp 12   Ht 5' 2\" (1.575 m)   Wt 212 lb 15.4 oz (96.6 kg)   SpO2 96%   BMI 38.95 kg/m²   Temp (24hrs), Av.2 °F (37.3 °C), Min:98.9 °F (37.2 °C), Max:99.4 °F (37.4 °C)    No results for input(s): POCGLU in the last 72 hours. No intake or output data in the 24 hours ending 21 0348    Physical Exam  Vitals and nursing note reviewed. Constitutional:       General: She is in acute distress (shivering ). Appearance: She is well-developed. She is ill-appearing. She is not diaphoretic. HENT:      Head: Normocephalic and atraumatic. Right Ear: Hearing normal.      Left Ear: Hearing normal.      Nose: Nose normal. No rhinorrhea. Eyes:      General: Lids are normal. No visual field deficit. Extraocular Movements:      Right eye: Normal extraocular motion. Left eye: Normal extraocular motion. Conjunctiva/sclera: Conjunctivae normal.      Right eye: Right conjunctiva is not injected. Left eye: Left conjunctiva is not injected. Pupils: Pupils are equal, round, and reactive to light. Pupils are equal.      Right eye: Pupil is reactive. Left eye: Pupil is reactive. Neck:      Thyroid: No thyromegaly. Vascular: No carotid bruit. Trachea: Trachea and phonation normal. No tracheal deviation. Cardiovascular:      Rate and Rhythm: Normal rate and regular rhythm. Pulses: Normal pulses. Heart sounds: Normal heart sounds. No murmur heard. Pulmonary:      Effort: Pulmonary effort is normal. No respiratory distress. Breath sounds: Normal breath sounds. No stridor. No decreased breath sounds.    Abdominal: General: Bowel sounds are normal. There is no distension. Palpations: Abdomen is soft. There is no mass. Tenderness: There is no abdominal tenderness. There is no guarding. Musculoskeletal:         General: No tenderness. Cervical back: Neck supple. Left lower leg: Swelling (ace wrap, recent arthoscopy ) present. Skin:     General: Skin is warm and dry. Findings: No erythema, lesion or rash. Neurological:      Mental Status: She is alert. She is disoriented and confused. Cranial Nerves: No cranial nerve deficit, dysarthria or facial asymmetry. Sensory: No sensory deficit. Comments: Word loss, confusion, word searching. When attempting the date became disorganized in though and had wrong date    Psychiatric:         Speech: Speech normal.         Behavior: Behavior normal. Behavior is cooperative.          Investigations:      Laboratory Testing:  Recent Results (from the past 24 hour(s))   Basic Metabolic Panel    Collection Time: 07/20/21  4:24 PM   Result Value Ref Range    Glucose 129 (H) 70 - 99 mg/dL    BUN 24 (H) 6 - 20 mg/dL    CREATININE 1.14 (H) 0.50 - 0.90 mg/dL    Bun/Cre Ratio 21 (H) 9 - 20    Calcium 8.6 8.6 - 10.4 mg/dL    Sodium 139 135 - 144 mmol/L    Potassium 4.5 3.7 - 5.3 mmol/L    Chloride 101 98 - 107 mmol/L    CO2 26 20 - 31 mmol/L    Anion Gap 12 9 - 17 mmol/L    GFR Non-African American 49 (L) >60 mL/min    GFR African American 60 (L) >60 mL/min    GFR Comment          GFR Staging         CBC Auto Differential    Collection Time: 07/20/21  4:24 PM   Result Value Ref Range    WBC 12.5 (H) 3.5 - 11.3 k/uL    RBC 4.14 3.95 - 5.11 m/uL    Hemoglobin 12.5 11.9 - 15.1 g/dL    Hematocrit 38.9 36.3 - 47.1 %    MCV 94.0 82.6 - 102.9 fL    MCH 30.2 25.2 - 33.5 pg    MCHC 32.1 28.4 - 34.8 g/dL    RDW 13.7 11.8 - 14.4 %    Platelets 503 708 - 612 k/uL    MPV 9.4 8.1 - 13.5 fL    NRBC Automated 0.0 0.0 per 100 WBC    Differential Type NOT REPORTED     WBC Morphology NOT REPORTED     RBC Morphology NOT REPORTED     Platelet Estimate NOT REPORTED     Seg Neutrophils 77 (H) 36 - 65 %    Lymphocytes 10 (L) 24 - 43 %    Monocytes 13 (H) 3 - 12 %    Eosinophils % 0 (L) 1 - 4 %    Immature Granulocytes 0 0 %    Basophils 0 0 - 2 %    Segs Absolute 9.62 (H) 1.50 - 8.10 k/uL    Absolute Lymph # 1.25 1.10 - 3.70 k/uL    Absolute Mono # 1.63 (H) 0.10 - 1.20 k/uL    Absolute Eos # 0.00 0.00 - 0.44 k/uL    Absolute Immature Granulocyte 0.00 0.00 - 0.30 k/uL    Basophils Absolute 0.00 0.0 - 0.2 k/uL    Morphology Normal    TOX SCR, BLD, ED    Collection Time: 07/20/21  4:24 PM   Result Value Ref Range    Acetaminophen Level <5 (L) 10 - 30 ug/mL    Ethanol <10 <10 mg/dL    Ethanol percent <8.363 <5.830 %    Salicylate Lvl <1 (L) 3 - 10 mg/dL    Toxic Tricyclic Sc,Blood NEGATIVE NEGATIVE   Protime-INR    Collection Time: 07/20/21  4:24 PM   Result Value Ref Range    Protime 15.2 (H) 11.5 - 14.2 sec    INR 1.2    Troponin    Collection Time: 07/20/21  4:24 PM   Result Value Ref Range    Troponin, High Sensitivity 49 (H) 0 - 14 ng/L    Troponin T NOT REPORTED <0.03 ng/mL    Troponin Interp NOT REPORTED    Lactic acid, plasma    Collection Time: 07/20/21  4:24 PM   Result Value Ref Range    Lactic Acid 1.3 0.5 - 2.2 mmol/L    Lactic Acid, Whole Blood NOT REPORTED 0.7 - 2.1 mmol/L   Procalcitonin    Collection Time: 07/20/21  4:24 PM   Result Value Ref Range    Procalcitonin 0.11 (H) <0.09 ng/mL   Troponin    Collection Time: 07/20/21  5:50 PM   Result Value Ref Range    Troponin, High Sensitivity 49 (H) 0 - 14 ng/L    Troponin T NOT REPORTED <0.03 ng/mL    Troponin Interp NOT REPORTED    TSH without Reflex    Collection Time: 07/20/21  5:50 PM   Result Value Ref Range    TSH 0.90 0.30 - 5.00 mIU/L   Brain Natriuretic Peptide    Collection Time: 07/20/21  5:50 PM   Result Value Ref Range    Pro- (H) <300 pg/mL    BNP Interpretation Pro-BNP Reference Range: Imaging/Diagnostics:  CT Head WO Contrast    Result Date: 7/20/2021  No acute intracranial abnormality. Findings discussed via telephone with Dr. Nella Marino on 07/20/2021 at 4:05 p.m. XR CHEST PORTABLE    Result Date: 7/20/2021  No acute process. CT CHEST PULMONARY EMBOLISM W CONTRAST    Result Date: 7/20/2021  No evidence of pulmonary embolism or acute pulmonary abnormality. Thyroid nodules, amenable to further evaluation by ultrasound. Marked hepatic steatosis. CTA HEAD NECK W CONTRAST    Result Date: 7/20/2021  1. No evidence of large vessel occlusion, significant stenosis, dissection, or aneurysmal dilation. 2. Incidentally noted predominately left-sided enlarged heterogeneous thyroid which may represent multinodular goiter. Recommend correlation with thyroid ultrasound. Assessment :      Hospital Problems         Last Modified POA    * (Principal) Receptive aphasia 7/21/2021 Yes    TIA (transient ischemic attack) 7/20/2021 Yes    Thyroid nodule 7/21/2021 Yes    Fever, unspecified 7/21/2021 Yes          Plan:     Patient status inpatient in the Med/Surge    1. Consultation to neurology  2. MRI of the brain  3. EEG recommended if okay with neurology  4. Risk factor assessment with lipid panel A1c  5. Check cardiac echo  6. Initiate aspirin and statin therapy  7. Tox screen  8. Work-up for fever with urinalysis  9. Repeat chest x-ray  10. Thyroid nodule work-up in an outpatient setting as TSH is normal  11. GI prophylaxis  12. DVT prophylaxis    Consultations:   IP CONSULT TO NEUROLOGY     Patient is admitted as inpatient status because of co-morbidities listed above, severity of signs and symptoms as outlined, requirement for current medical therapies and most importantly because of direct risk to patient if care not provided in a hospital setting. Expected length of stay > 48 hours. Tracy Maynard, APRN - [id number]  7/21/2021  3:48 AM    Copy sent to Dr. Fisher primary care provider on file.

## 2021-07-21 NOTE — PROGRESS NOTES
Physical Therapy        Physical Therapy Cancel Note      DATE: 2021    NAME: Gertie Cockayne  MRN: [id number]   : 1965      Patient not seen this date for Physical Therapy due to:    Testing: EEG      Electronically signed by Juan Francisco Piedra PT on 2021 at 2:01 PM

## 2021-07-21 NOTE — PROCEDURES
[facility]                  [address], [place] [address]                          ELECTROENCEPHALOGRAM REPORT    PATIENT NAME: Nemesio Tran                       :        1965  MED REC NO:   [id number]                             ROOM:       7096  ACCOUNT NO:   [de-identified]                           ADMIT DATE: 2021  PROVIDER:     Ivelisse Schneider MD    DATE OF EE2021    ATTENDING OF RECORD:  Irish Reyes MD    REASON FOR STUDY:  This is a 51-year-old patient with encephalopathy,  trouble with speech, confusion. MEDICATIONS:  Include Tylenol, aspirin, Lipitor. EEG FINDINGS:  This is a 16-channel EEG with one EKG channel recording  performed on a patient described to be awake, drowsy, and asleep. The  patient shows normal waking rhythms. Background activity consists of 9  Hz activity in the 40-60 microvolt range, more prominent over the  posterior head area, showing some reactivity to eye opening and closing. Over the anterior head regions, there are 15-20 Hz activity in 20-30  microvolt range. The record is prominent for mild-to-moderate left  hemispheric 4-7 Hz activity over the left parietotemporal head area,  admixed with intermittent left temporal sharp wave formation. There is  also the presence of frontal intermittent rhythmic delta activity. The  patient with sleep does have intrusion of slower frequencies in a theta  and delta band. Hyperventilation is not performed. Photic stimulation  shows no change of the record. IMPRESSION:  This EEG shows the presence of mild-to-moderate left  hemispheric parietotemporal slowing admixed with left temporal sharp  wave formation which is suggestive of focal structural process over this  area. Three is also the presence of frontal intermittent rhythmic delta  activity. Clinical correlation is warranted.         Donnie Mina MD    D: 2021 46:14:60       T: 07/21/2021 18:35:34     [email or web address]  Job#: [id number]     Doc#: [id number]    CC:

## 2021-07-21 NOTE — PROGRESS NOTES
[facility])  Occupational Therapy Not Seen Note    DATE: 2021    NAME: Og Treviño  MRN: [id number]   : 1965      Patient not seen this date for Occupational Therapy due to:    Testing: EEG    Next Scheduled Treatment: 2021    Electronically signed by NIKOS Cardona on 2021 at 1:49 PM

## 2021-07-21 NOTE — PROGRESS NOTES
Speech Language Pathology  Facility/Department: [facility]URO  Initial Speech/Language/Cognitive Assessment    NAME: Jasper Holder  : 1965   MRN: [id number]  ADMISSION DATE: 2021  ADMITTING DIAGNOSIS: has TIA (transient ischemic attack); Receptive aphasia; Thyroid nodule; Fever, unspecified; and Encephalopathy on their problem list.    Date of Eval: 2021   Evaluating Therapist: Amirah Breen    Primary Complaint:   Jasper Holder is a 64 y.o. Non- / non  female who presents with No chief complaint on file. and is admitted to the hospital for the management of Receptive aphasia.     Patient presented to the outlying emergency room with the concern of disorientation/alteration in mentation and word difficulty. Apparently patient was at her normal  Baseline on frequent checks up until arrival to the emergency room in which her mother heard noises and went to the room to check on her and she was altered. At that time EMS was called, EMS felt the pupils were pinpoint with the patient awake and talking, however they administered narcan and felt there was improvement in her verbal response and history telling,  Upon arrival to the emergency room, the patient was noted to be hypoxic with a right sided weakness with sensory deficit, disoriented and oriented to self. Stroke alert called. In review patient had a recent right knee arthroscopy the day prior and was on oxycodone which she states she was taking appropriate. She also has been following with a specialist for Lyme disease. Pain:  Pain Assessment  Pain Assessment: 0-10  Pain Level:  (Fever)    Assessment:  Pt presents with mild to severe cognitive deficits characterized by impaired biographical information, word associations, immediate recall, delayed recall, similarities/differences, thought flexibility, deductive reasoning, and word generation. t becoming frustrated with word finding deficits throughout evaluation. Pt. Presents with no dysarthria, noO/M deficits at this time. ST to follow up and provide treatment to address noted deficits. Education provided. ST recommends therapy at this time. Recommendations:  Requires SLP Intervention: Yes  Duration/Frequency of Treatment: 3-5x/week  D/C Recommendations: Further therapy recommended at discharge. Plan:   Goals:  Short-term Goals  Goal 1: Pt will state biographical information with 90%accuracy  Goal 2: Pt will complete word associations with 90% accuracy  Goal 3: Pt will recall 3-5 units with and without distractions with 90% accuracy  Goal 4: Pt will state similarities/differences with 90% accuracy  Goal 5: Pt will complete abstract reasoning/ deductive reasoning tasks with 90% accuracy  Goal 6: Pt will complete word generation tasks with 90% accuracy   Patient/family involved in developing goals and treatment plan: yes    Subjective:  General  Chart Reviewed: Yes  Family / Caregiver Present: Yes  Social/Functional History  Lives With: Spouse  Vision  Vision: Within Functional Limits  Hearing  Hearing: Within functional limits     Objective:  Oral/Motor  Oral Motor: Within functional limits    Expression  Primary Mode of Expression: Verbal    Verbal Expression  Verbal Expression: Within functional limits    Motor Speech  Motor Speech: Within Functional Limits    Cognition:   Orientation  Overall Orientation Status: Impaired  Orientation Level: Disoriented to time (disoriented to year and month, increased response time for age and )  Attention  Attention: Within Functional Limits  Memory  Memory: Exceptions to [facility]  Short-term Memory: Mild (0/3, 2/3)  Immedate Memory: Moderate (3/3 1/5 3/3)  Problem Solving  Problem Solving: Exceptions to [facility]  Verbal Reasoning Skills:  Moderate (deducative reasoning: 3/5, similaities/differences: 3/4)  Safety/Judgement  Safety/Judgement: Exceptions to [facility]  Flexibility of Thought: Moderate (4/6)  Word associations: severe (1/4)  Word

## 2021-07-21 NOTE — PLAN OF CARE
Problem: Falls - Risk of:  Goal: Will remain free from falls  Description: Will remain free from falls  7/21/2021 1952 by Milton Rosado RN  Outcome: Met This Shift  7/21/2021 0617 by Grayson Medrano RN  Outcome: Ongoing  Goal: Absence of physical injury  Description: Absence of physical injury  Outcome: Met This Shift

## 2021-07-22 ENCOUNTER — APPOINTMENT (OUTPATIENT)
Dept: CARDIAC CATH/INVASIVE PROCEDURES | Age: 56
DRG: 040 | End: 2021-07-22
Attending: INTERNAL MEDICINE
Payer: COMMERCIAL

## 2021-07-22 PROBLEM — I63.9 CEREBRAL INFARCTION (HCC): Status: ACTIVE | Noted: 2021-07-22

## 2021-07-22 LAB
ALBUMIN SERPL-MCNC: 3.5 G/DL (ref 3.5–5.2)
ALBUMIN/GLOBULIN RATIO: 1.4 (ref 1–2.5)
ALP BLD-CCNC: 92 U/L (ref 35–104)
ALT SERPL-CCNC: 59 U/L (ref 5–33)
ANION GAP SERPL CALCULATED.3IONS-SCNC: 11 MMOL/L (ref 9–17)
AST SERPL-CCNC: 27 U/L
BILIRUB SERPL-MCNC: 0.3 MG/DL (ref 0.3–1.2)
BILIRUBIN DIRECT: 0.12 MG/DL
BILIRUBIN, INDIRECT: 0.18 MG/DL (ref 0–1)
BUN BLDV-MCNC: 16 MG/DL (ref 6–20)
CALCIUM SERPL-MCNC: 8.7 MG/DL (ref 8.6–10.4)
CHLORIDE BLD-SCNC: 103 MMOL/L (ref 98–107)
CO2: 28 MMOL/L (ref 20–31)
CREAT SERPL-MCNC: 0.82 MG/DL (ref 0.5–0.9)
EKG ATRIAL RATE: 72 BPM
EKG P AXIS: 44 DEGREES
EKG P-R INTERVAL: 170 MS
EKG Q-T INTERVAL: 414 MS
EKG QRS DURATION: 98 MS
EKG QTC CALCULATION (BAZETT): 453 MS
EKG R AXIS: -9 DEGREES
EKG T AXIS: 9 DEGREES
EKG VENTRICULAR RATE: 72 BPM
FOLATE: 12 NG/ML
GFR AFRICAN AMERICAN: >60 ML/MIN
GFR NON-AFRICAN AMERICAN: >60 ML/MIN
GFR SERPL CREATININE-BSD FRML MDRD: ABNORMAL ML/MIN/{1.73_M2}
GFR SERPL CREATININE-BSD FRML MDRD: ABNORMAL ML/MIN/{1.73_M2}
GLUCOSE BLD-MCNC: 96 MG/DL (ref 70–99)
HOMOCYSTEINE: 10.2 UMOL/L
LV EF: 55 %
LVEF MODALITY: NORMAL
LYME ANTIBODY: 0.46
POTASSIUM SERPL-SCNC: 3.9 MMOL/L (ref 3.7–5.3)
SARS-COV-2, RAPID: NOT DETECTED
SEDIMENTATION RATE, ERYTHROCYTE: 19 MM (ref 0–30)
SODIUM BLD-SCNC: 142 MMOL/L (ref 135–144)
SPECIMEN DESCRIPTION: NORMAL
TOTAL PROTEIN: 6 G/DL (ref 6.4–8.3)
VITAMIN B-12: 438 PG/ML (ref 232–1245)

## 2021-07-22 PROCEDURE — 85240 CLOT FACTOR VIII AHG 1 STAGE: CPT

## 2021-07-22 PROCEDURE — 83090 ASSAY OF HOMOCYSTEINE: CPT

## 2021-07-22 PROCEDURE — 0JH602Z INSERTION OF MONITORING DEVICE INTO CHEST SUBCUTANEOUS TISSUE AND FASCIA, OPEN APPROACH: ICD-10-PCS | Performed by: INTERNAL MEDICINE

## 2021-07-22 PROCEDURE — 86225 DNA ANTIBODY NATIVE: CPT

## 2021-07-22 PROCEDURE — 80053 COMPREHEN METABOLIC PANEL: CPT

## 2021-07-22 PROCEDURE — 6360000002 HC RX W HCPCS

## 2021-07-22 PROCEDURE — 82746 ASSAY OF FOLIC ACID SERUM: CPT

## 2021-07-22 PROCEDURE — 33285 INSJ SUBQ CAR RHYTHM MNTR: CPT | Performed by: INTERNAL MEDICINE

## 2021-07-22 PROCEDURE — B246ZZ4 ULTRASONOGRAPHY OF RIGHT AND LEFT HEART, TRANSESOPHAGEAL: ICD-10-PCS | Performed by: INTERNAL MEDICINE

## 2021-07-22 PROCEDURE — 85652 RBC SED RATE AUTOMATED: CPT

## 2021-07-22 PROCEDURE — 2060000000 HC ICU INTERMEDIATE R&B

## 2021-07-22 PROCEDURE — 93325 DOPPLER ECHO COLOR FLOW MAPG: CPT

## 2021-07-22 PROCEDURE — 86038 ANTINUCLEAR ANTIBODIES: CPT

## 2021-07-22 PROCEDURE — 87635 SARS-COV-2 COVID-19 AMP PRB: CPT

## 2021-07-22 PROCEDURE — 93312 ECHO TRANSESOPHAGEAL: CPT

## 2021-07-22 PROCEDURE — 99232 SBSQ HOSP IP/OBS MODERATE 35: CPT | Performed by: STUDENT IN AN ORGANIZED HEALTH CARE EDUCATION/TRAINING PROGRAM

## 2021-07-22 PROCEDURE — 6360000002 HC RX W HCPCS: Performed by: STUDENT IN AN ORGANIZED HEALTH CARE EDUCATION/TRAINING PROGRAM

## 2021-07-22 PROCEDURE — 85300 ANTITHROMBIN III ACTIVITY: CPT

## 2021-07-22 PROCEDURE — 2700000000 HC OXYGEN THERAPY PER DAY

## 2021-07-22 PROCEDURE — 85305 CLOT INHIBIT PROT S TOTAL: CPT

## 2021-07-22 PROCEDURE — 82248 BILIRUBIN DIRECT: CPT

## 2021-07-22 PROCEDURE — C1764 EVENT RECORDER, CARDIAC: HCPCS

## 2021-07-22 PROCEDURE — 36415 COLL VENOUS BLD VENIPUNCTURE: CPT

## 2021-07-22 PROCEDURE — 82607 VITAMIN B-12: CPT

## 2021-07-22 PROCEDURE — 6370000000 HC RX 637 (ALT 250 FOR IP): Performed by: CLINICAL NURSE SPECIALIST

## 2021-07-22 PROCEDURE — 2709999900 HC NON-CHARGEABLE SUPPLY

## 2021-07-22 PROCEDURE — 85302 CLOT INHIBIT PROT C ANTIGEN: CPT

## 2021-07-22 PROCEDURE — 86147 CARDIOLIPIN ANTIBODY EA IG: CPT

## 2021-07-22 PROCEDURE — 93970 EXTREMITY STUDY: CPT

## 2021-07-22 PROCEDURE — 99232 SBSQ HOSP IP/OBS MODERATE 35: CPT | Performed by: PSYCHIATRY & NEUROLOGY

## 2021-07-22 PROCEDURE — 6370000000 HC RX 637 (ALT 250 FOR IP): Performed by: NURSE PRACTITIONER

## 2021-07-22 PROCEDURE — 93010 ELECTROCARDIOGRAM REPORT: CPT | Performed by: INTERNAL MEDICINE

## 2021-07-22 PROCEDURE — 2580000003 HC RX 258: Performed by: CLINICAL NURSE SPECIALIST

## 2021-07-22 PROCEDURE — 85230 CLOT FACTOR VII PROCONVERTIN: CPT

## 2021-07-22 RX ORDER — SODIUM CHLORIDE 0.9 % (FLUSH) 0.9 %
5-40 SYRINGE (ML) INJECTION EVERY 12 HOURS SCHEDULED
Status: CANCELLED | OUTPATIENT
Start: 2021-07-22

## 2021-07-22 RX ORDER — SODIUM CHLORIDE 0.9 % (FLUSH) 0.9 %
5-40 SYRINGE (ML) INJECTION PRN
Status: CANCELLED | OUTPATIENT
Start: 2021-07-22

## 2021-07-22 RX ORDER — SODIUM CHLORIDE 9 MG/ML
25 INJECTION, SOLUTION INTRAVENOUS PRN
Status: CANCELLED | OUTPATIENT
Start: 2021-07-22

## 2021-07-22 RX ADMIN — SODIUM CHLORIDE, PRESERVATIVE FREE 10 ML: 5 INJECTION INTRAVENOUS at 22:26

## 2021-07-22 RX ADMIN — Medication 81 MG: at 09:07

## 2021-07-22 RX ADMIN — SODIUM CHLORIDE, PRESERVATIVE FREE 10 ML: 5 INJECTION INTRAVENOUS at 09:08

## 2021-07-22 RX ADMIN — ACETAMINOPHEN 650 MG: 325 TABLET ORAL at 08:27

## 2021-07-22 RX ADMIN — FERROUS SULFATE TAB EC 325 MG (65 MG FE EQUIVALENT) 325 MG: 325 (65 FE) TABLET DELAYED RESPONSE at 09:05

## 2021-07-22 RX ADMIN — DESMOPRESSIN ACETATE 40 MG: 0.2 TABLET ORAL at 22:26

## 2021-07-22 RX ADMIN — ACETAMINOPHEN 650 MG: 325 TABLET ORAL at 16:47

## 2021-07-22 RX ADMIN — ACETAMINOPHEN 650 MG: 325 TABLET ORAL at 22:28

## 2021-07-22 RX ADMIN — ENOXAPARIN SODIUM 40 MG: 40 INJECTION, SOLUTION INTRAVENOUS; SUBCUTANEOUS at 09:05

## 2021-07-22 RX ADMIN — FLUTICASONE PROPIONATE 1 SPRAY: 50 SPRAY, METERED NASAL at 09:05

## 2021-07-22 RX ADMIN — Medication 250 MG: at 09:05

## 2021-07-22 ASSESSMENT — PAIN DESCRIPTION - DESCRIPTORS: DESCRIPTORS: ACHING

## 2021-07-22 ASSESSMENT — PAIN SCALES - GENERAL
PAINLEVEL_OUTOF10: 4
PAINLEVEL_OUTOF10: 0
PAINLEVEL_OUTOF10: 4
PAINLEVEL_OUTOF10: 4

## 2021-07-22 ASSESSMENT — PAIN DESCRIPTION - LOCATION: LOCATION: HEAD;KNEE

## 2021-07-22 ASSESSMENT — PAIN DESCRIPTION - PAIN TYPE: TYPE: ACUTE PAIN

## 2021-07-22 ASSESSMENT — PAIN DESCRIPTION - ORIENTATION: ORIENTATION: RIGHT

## 2021-07-22 ASSESSMENT — PAIN DESCRIPTION - FREQUENCY: FREQUENCY: INTERMITTENT

## 2021-07-22 NOTE — PROGRESS NOTES
Physician Progress Note      Haile Mukherjee  CSN #:                  [id number]  :                       1965  ADMIT DATE:       2021 11:29 PM  [address] [place] DATE:  RESPONDING  PROVIDER #:        Altaf Karimi          QUERY TEXT:    Pt admitted with AMS with Aphasia and has Encephalopathy documented. If   possible, please document in progress notes and discharge summary further   specificity regarding the type of encephalopathy:    The medical record reflects the following:  Risk Factors: CVA,HTN,Lyme disease  Clinical Indicators: Disorientation/AMS on admission with Aphasia. Per H&P   'Word loss, confusion, word searching. When attempting the date became disorganized in though and had wrong date '. MRI shoewd L cortical ischemic stroke. Labs-Creat 1.14,wbc 12.5, Trops 49,   . TMax 99.4. Impression per Neurology consult/prog notes '   Encephalopathy'  Treatment: Neurology,Cardiology consults, Stroke workup, DONOVAN/Loop recorder   placement,monitoring  Options provided:  -- Metabolic encephalopathy  -- Encephalopathy due to CVA  -- Toxic metabolic encephalopathy  -- Other - I will add my own diagnosis  -- Disagree - Not applicable / Not valid  -- Disagree - Clinically unable to determine / Unknown  -- Refer to Clinical Documentation Reviewer    PROVIDER RESPONSE TEXT:    This patient has encephalopathy due to CVA.     Query created by: Gordon Saleh on 2021 2:54 PM      Electronically signed by:  Altaf Karimi 2021 3:32 PM

## 2021-07-22 NOTE — PLAN OF CARE
Problem: Falls - Risk of:  Goal: Will remain free from falls  Description: Will remain free from falls  7/22/2021 0524 by Kings Robles RN  Outcome: Ongoing  Goal: Absence of physical injury  Description: Absence of physical injury  7/22/2021 0524 by Kings Robles RN  Outcome: Ongoing    Pt assessed as a fall risk this shift. Remains free from falls and accidental injury at this time. Fall precautions in place, including falling star sign. Floor free from obstacles, and bed is locked and in lowest position. Adequate lighting provided. Pt encouraged to call before getting Out Of Bed for any need. Will continue to monitor needs during hourly rounding, and reinforce education on use of call light. Problem: HEMODYNAMIC STATUS  Goal: Patient has stable vital signs and fluid balance  Outcome: Ongoing     Problem: ACTIVITY INTOLERANCE/IMPAIRED MOBILITY  Goal: Mobility/activity is maintained at optimum level for patient  Outcome: Ongoing     Problem: COMMUNICATION IMPAIRMENT  Goal: Ability to express needs and understand communication  Outcome: Ongoing    Neuro assessment completed, fall precautions in place, aspirations precautions in place, assess for barriers in communication and mobility, interventions to assist in communication and mobility in place, encouraged to call for assistance, adaptive devices used as needed, assess emotional state and support offered, encouraged patient to communicate by available means, and support systems included in patient care. Problem: Pain:  Goal: Pain level will decrease  Description: Pain level will decrease  Outcome: Ongoing  Goal: Control of acute pain  Description: Control of acute pain  Outcome: Ongoing    Pain level assessment complete. Pt rated pain at 3/10. Pt educated on pain scale and control interventions. PRN pain medication given per pt request. Pt instructed to call out with new onset of pain or unrelieved pain. Will continue to monitor.

## 2021-07-22 NOTE — PROGRESS NOTES
Physical Therapy        Physical Therapy Cancel Note      DATE: 2021    NAME: Og Treviño  MRN: [id number]   : 1965      Patient not seen this date for Physical Therapy due to:    Testing: DONOVAN and await doppler results.       Electronically signed by Sourav Rudd PT on  at 11:13 AM

## 2021-07-22 NOTE — PROGRESS NOTES
[facility]  Pre-Procedure Conscious Sedation Data         Pre Procedure Conscious Sedation Data:  ASA Class:                  [] I [x] II [] III [] IV     Mallampati Class:       [] I [x] II [] III [] Katrin Avalos MD  Cardiovascular Disease Fellow  [address]

## 2021-07-22 NOTE — PROGRESS NOTES
[facility]  Speech Language Pathology    Date: 7/22/2021  Patient Name: Jose Rafael Saini  YOB: 1965   AGE: 64 y.o. MRN: [id number]        Patient Not Available for Speech Therapy     Due to:  [] Testing  [] Hemodialysis  [] Cancelled by RN  [] Surgery   [] Intubation/Sedation/Pain Medication  [] Medical instability  [x] Other: Pt not seen for therapy due to being in cath lab. Next scheduled treatment: 7/23/2021    Completed by:  Luci Hand  Clinician    Cosigned By: Cristi Fregoso S.CCC/SLP

## 2021-07-22 NOTE — CARE COORDINATION
Case Management Initial Discharge Plan  Mari Beatty,             Met with:patient to discuss discharge plans. Information verified: address, contacts, phone number, , insurance Yes  Insurance Provider: Claudine Hall [address]    Emergency Contact/Next of Kin name & number:  Neftaly De La Rosa [phone]  Who are involved in patient's support system? family    PCP:JANELLE LEWIS  Date of last visit: 2021      Discharge Planning    Living Arrangements:  Spouse/Significant Other     Home has 1 stories  0 stairs to climb to get into front door, 0stairs to climb to reach second floor  Location of bedroom/bathroom in home main    Patient able to perform ADL's:Independent    Current Services (outpatient & in home) none  DME equipment: none  DME provider:  n/a    Is patient receiving oral anticoagulation therapy? No    If indicated:   Physician managing anticoagulation treatment:   Where does patient obtain lab work for ATC treatment? Potential Assistance Needed:  N/A    Patient agreeable to home care: No  [place] of choice provided:  n/a    Prior SNF/Rehab Placement and Facility:   Agreeable to SNF/Rehab: No  [place] of choice provided: n/a     Evaluation: no    Expected Discharge date:  21    Patient expects to be discharged to: If home: is the family and/or caregiver wiling & able to provide support at home? Yes   Who will be providing this support? *    Follow Up Appointment: Best Day/ Time:      Transportation provider: family  Transportation arrangements needed for discharge: Yes    Readmission Risk              Risk of Unplanned Readmission:  10             Does patient have a readmission risk score greater than 14?: No  If yes, follow-up appointment must be made within 7 days of discharge.      Goals of Care:  Safety      Educated Patient on transitional options, provided freedom of choice and are agreeable with plan      Discharge Plan: Home independently;y          Electronically signed by Hassan RN on 7/22/21 at 4:01 PM EDT

## 2021-07-22 NOTE — CARE COORDINATION
PHQ 9-   Pt presents with AMS, per MRI acute infarct left parietal lobe  Met with pt this date to assess for support and needs. Pt was awake and cooperative  Pt denies any feelings of depression or SI  Pt states her sleep and appetite has been ok  Denies  any problems with concentration  Pt lives with her . Has 3 adult children and 5 grandchildren. Pt states she has a very supportive family. Patient Health Questionnaire-9 (PHQ-9)    Over the last 2 weeks, how often have you been bothered by any of the following problems? 1. Little Interest or pleasure in doing things? [x] Not at all  [] Several Days  [] More than half the day  []  Nearly every day    2. Feeling down, depressed or hopeless? [x] Not at all  [] Several Days  [] More than half the day  []  Nearly every day    3. Trouble falling or staying asleep, or sleeping too much? [x] Not at all  [] Several Days  [] More than half the day  []  Nearly every day    4. Feeling tired or having little energy? [x] Not at all  [] Several Days  [] More than half the day  []  Nearly every day    5. Poor apettite or overeating? [x] Not at all  [] Several Days  [] More than half the day  []  Nearly every day    6. Feeling bad about yourself-or that you are a failure or have let yourself or your family down? [x] Not at all  [] Several Days  [] More than half the day  []  Nearly every day    7. Trouble concentrating on things, such as reading the newspaper or watching television? [x] Not at all  [] Several Days  [] More than half the day  []  Nearly every day    8. Moving or speaking so slowly that other people could have noticed? Or the opposite-being so fidgety or restless that you have been moving around a lot more than usual?   [x] Not at all  [] Several Days  [] More than half the day  []  Nearly every day    9. Thoughts that you would be better off dead or of hurting yourself in some way?    [x] Not at all  [] Several Days  [] More than half the day  []  Nearly every day    Total Score: 0    If you checked off any problems, how difficult have these problems made it for you to do your work, take care of things at home, or get along with other people?    [x] Not difficult at all  [] Somewhat Difficult  [] Very Difficult  []  Extremely Difficult

## 2021-07-22 NOTE — OP NOTE
[facility]                Procedure Note  LOOP RECORDER IMPLANT      Quan Rodriguez (71 y.o., female)  1965 7/22/2021      Procedure: Loop Recorder Implant    Operators:  Primary: Jay Lu MD  Assistant/CV Fellow: Augusto Rivas MD    Indication:  CVA    Device / Data:       Model # Serial #   Recorder [id number]       Sedation monitoring  Loop recorder Implant  R wave 0.30          Procedure  After the usual preparation of the left neck and chest, the patient was draped in the usual sterile manner. Local anesthesia was infused below the left clavicle from the midline laterally. An incision was made below the left breast, lateral to midline. The incision was dilated. The Loop recorder (Reveal) System was advanced using the standard techniques, and positioned successfully with no bleeding or compliaction    Impression / Device:  Successful Implantation of: Reveal / Loop Recorder  Estimated blood loss less than 5 ml     Plan:  Telemetry monitoring  Interrogate Recorder before discharge  Wound check at [facility] in 7days  Discharge if patient remains stable    Electronically signed by Berta Ontiveros MD on 7/22/2021 at 11:24 AM      Attending Physician Statement  I have discussed the case of Quan Rodriguez including pertinent history and exam findings with the resident. I have seen and examined the patient and the key elements of the encounter have been performed by me. I agree with the assessment, plan and orders as documented by the resident With changes made to the note.   I was present during entire procedure and performed all critical elements of the procedure    Electronically signed by Laury Gaucher, MD on 7/22/2021 at 2:35 PM.    [facility]      [phone]

## 2021-07-22 NOTE — PROGRESS NOTES
Patient unavailable for AM cardiology rounds. Off unit per RN for vascular scan. Plan for DONOVAN today. Spoke with Vadim Hoyt in Cath lab. Patient will go for DONOVAN from vascular lab.

## 2021-07-22 NOTE — PROGRESS NOTES
Pt returned from DONOVAN and Loop recorder procedure. Procedure successful with no complications. Site under R breast is clean, dry and intact. Pt vitals stable. Pt educated on remaining NPO until gag reflex returns. Pt instructed to call for any assistance that may be needed. Will continue to monitor.      Electronically signed by Zabrina Saavedra RN on 7/22/2021 at 12:15 PM

## 2021-07-22 NOTE — PROGRESS NOTES
[facility]  Office: [address], DO, Steinberg Kikisar, DO, Kelechi Narrow, DO, Avery Irvinmonae Blood, DO, Alyssa Weems MD, Theo Young MD, Wiley Vargas MD, Yumiko Medina MD, Noreen Gonzalez MD, Cristi Calloway MD, Aye Ashton MD, Sumeet Longoria, DO, Nelson Wilkins MD, Marty Hernandez, DO, Epifanio Araujo MD,  Rodo Gilmore, DO, Kaci Morfin MD, Errol Bates MD, Lee Day MD, Shanelle Cervantes MD, Filemon Carranza MD, Mami Chanel MD, Randell Varela, [facility], [facility] Center[facility]MI Almendarezalley, CNP, Javier Bee, CNP, Laura Councilman, CNS, Thom Durand, CNP, Brian Tejada, CNP, Audrey Lora, CNP, Lily Herron, CNP, Hamilton Black, CNP, Celestine Triplett PA-C, Italia Estrada, [facility], Riky Ames, CNP, Herminia Child, CNP, Trinda Starch, CNP, Katie Kimberly, CNP, Kim Kunicola, CNP, Joann Copper, CNP, Helen Keene, CNP, Shailesh Valentino, [address] [place] [address]    Progress Note    7/22/2021    5:20 PM    Name:   Juvencio Catherine  MRN:     [id number]     Acct:      [de-identified]   Room:   [address] [place] [address] Day:  2  Admit Date:  7/20/2021 11:29 PM    PCP:   MARÍA Omer CNP  Code Status:  Full Code    Subjective:     C/C: Word finding difficulty  Interval History Status: improved. Patient seen after loop recorder placement  Patient states that her expressive aphasia is improving. Explained about plan of neurology to do LP tomorrow. Patient has had LP around 2 weeks ago for lyme and it was negative. Doppler lower extremity done but results pending. No chest pain or palpitations    Brief History:     44-year-old female with known medical history of depression, hepatic steatosis, hypertension, presented with expressive aphasia. Seen to have acute infarct in the left parietal lobe on the MRI scan.   Initial concern for hypoxia in the outlying hospital, CT PE negative for pulmonary embolism, no evidence of pneumonia    Review of Systems: Constitutional:  negative for chills, fevers, sweats  Respiratory:  negative for cough, dyspnea on exertion, shortness of breath, wheezing  Cardiovascular:  negative for chest pain, chest pressure/discomfort, lower extremity edema, palpitations  Gastrointestinal:  negative for abdominal pain, constipation, diarrhea, nausea, vomiting  Neurological:  Minimal word finding difficulty, negative for dizziness, headache    Medications: Allergies:  No Known Allergies    Current Meds:   Scheduled Meds:    vitamin C  250 mg Oral Daily    aspirin  81 mg Oral Daily    ferrous sulfate  325 mg Oral Daily with breakfast    fluticasone  1 spray Each Nostril Daily    [Held by provider] traZODone  100 mg Oral Nightly    sodium chloride flush  10 mL Intravenous 2 times per day    atorvastatin  40 mg Oral Nightly     Continuous Infusions:    sodium chloride       PRN Meds: acetaminophen, sodium chloride flush, sodium chloride flush, sodium chloride, ondansetron **OR** ondansetron, magnesium hydroxide    Data:     Past Medical History:   has a past medical history of Lyme disease. Social History:   reports that she has never smoked. She has never used smokeless tobacco.     Family History:   Family History   Problem Relation Age of Onset   Anthony Rudder Cancer Mother     Cancer Father        Vitals:  /76   Pulse 75   Temp 98.1 °F (36.7 °C) (Oral)   Resp 16   Ht 5' 2\" (1.575 m)   Wt 212 lb 15.4 oz (96.6 kg)   SpO2 94%   BMI 38.95 kg/m²   Temp (24hrs), Av.3 °F (36.8 °C), Min:98.1 °F (36.7 °C), Max:98.4 °F (36.9 °C)    No results for input(s): POCGLU in the last 72 hours. I/O (24Hr):     Intake/Output Summary (Last 24 hours) at 2021 1720  Last data filed at 2021  Gross per 24 hour   Intake 10 ml   Output --   Net 10 ml       Labs:  Hematology:  Recent Labs     21  1624 21  0439 21  0630   WBC 12.5* 10.2 11.3   RBC 4.14 3.82* 3.73*   HGB 12.5 11.2* 11.0*   HCT 38.9 36.6 35.6*   MCV 94.0 95.8 95.4   MCH 30.2 29.3 29.5   MCHC 32.1 30.6 30.9   RDW 13.7 13.7 13.7    235 231   MPV 9.4 9.7 9.7   SEDRATE  --  19  --    CRP  --  71.7*  --    INR 1.2  --   --      Chemistry:  Recent Labs     07/20/21  1624 07/20/21  1624 07/20/21  1750 07/21/21 0439 07/21/21  0630 07/21/21  1055 07/22/21  0555     --   --  136  --   --  142   K 4.5  --   --  4.2  --   --  3.9     --   --  102  --   --  103   CO2 26  --   --  23  --   --  28   GLUCOSE 129*  --   --  102*  --   --  96   BUN 24*  --   --  17  --   --  16   CREATININE 1.14*  --   --  0.92*  --   --  0.82   ANIONGAP 12  --   --  11  --   --  11   LABGLOM 49*  --   --  >60  --   --  >60   GFRAA 60*  --   --  >60  --   --  >60   CALCIUM 8.6  --   --  8.4*  --   --  8.7   PROBNP  --   --  858*  --   --   --   --    TROPHS 49*   < > 49*  --  56* 47*  --    LACTACIDWB NOT REPORTED  --   --   --   --   --   --     < > = values in this interval not displayed. Recent Labs     07/20/21  1750 07/21/21 0439 07/21/21  0630 07/22/21  0555   PROT  --  6.0*  --  6.0*   LABALBU  --  3.5  --  3.5   LABA1C  --  6.0  --   --    TSH 0.90  --   --   --    AST  --  64*  --  27   ALT  --  87*  --  59*   ALKPHOS  --  111*  --  92   BILITOT  --  0.41  --  0.30   BILIDIR  --   --   --  0.12   AMMONIA  --   --  53*  --    CHOL  --  171  --   --    HDL  --  53  --   --    LDLCHOLESTEROL  --  97  --   --    CHOLHDLRATIO  --  3.2  --   --    TRIG  --  103  --   --    VLDL  --  NOT REPORTED  --   --      ABG:No results found for: POCPH, PHART, PH, POCPCO2, CFM5YTN, PCO2, POCPO2, PO2ART, PO2, POCHCO3, WLW1MZA, HCO3, NBEA, PBEA, BEART, BE, THGBART, THB, STM1NYQ, YBDF0CYN, Z7PMKUMR, O2SAT, FIO2  Lab Results   Component Value Date/Time    SPECIAL LH 12ML 07/20/2021 04:45 PM     Lab Results   Component Value Date/Time    CULTURE NO GROWTH 2 DAYS 07/20/2021 04:45 PM       Radiology:  CT Head WO Contrast    Result Date: 7/20/2021  No acute intracranial abnormality. Findings discussed via telephone with Dr. Debbie Kern on 07/20/2021 at 4:05 p.m. XR CHEST PORTABLE    Result Date: 7/20/2021  No acute process. CT CHEST PULMONARY EMBOLISM W CONTRAST    Result Date: 7/20/2021  No evidence of pulmonary embolism or acute pulmonary abnormality. Thyroid nodules, amenable to further evaluation by ultrasound. Marked hepatic steatosis. CTA HEAD NECK W CONTRAST    Result Date: 7/20/2021  1. No evidence of large vessel occlusion, significant stenosis, dissection, or aneurysmal dilation. 2. Incidentally noted predominately left-sided enlarged heterogeneous thyroid which may represent multinodular goiter. Recommend correlation with thyroid ultrasound. MRI BRAIN W WO CONTRAST    Addendum Date: 7/21/2021    ADDENDUM: Findings discussed via telephone with Dr. Areli Harkins on 07/21/2021 at 1:44 p.m. Raven Tubbs Result Date: 7/21/2021  1. Acute infarct centered in the left parietal lobe. No evidence of hemorrhagic transformation. 2. No suspicious pachymeningeal or leptomeningeal enhancement or thickening.        Physical Examination:        General appearance:  alert, cooperative and no distress  Mental Status:  oriented to person, place and time and normal affect  Lungs:  clear to auscultation bilaterally, normal effort  Heart:  regular rate and rhythm, no murmur, loop recorder placed, site clean, no sokage of dressing  Abdomen:  soft, nontender, nondistended, normal bowel sounds, no masses, hepatomegaly, splenomegaly  Extremities:  no edema, redness, tenderness in the calves  Skin:  no gross lesions, rashes, induration  Neurological-expressive aphasia minimal    Assessment:        Hospital Problems         Last Modified POA    * (Principal) Parietal lobe infarction ([address] 7/21/2021 Yes    Receptive aphasia 7/21/2021 Yes    Thyroid nodule 7/21/2021 Yes    Fever, unspecified 7/21/2021 Yes    Encephalopathy 7/21/2021 Yes    Bipolar disorder ([address] 7/21/2021 Yes    Hepatic steatosis 7/21/2021 Yes Multinodular goiter 7/21/2021 Yes    Cerebral infarction ([address] 7/22/2021 Yes                Plan:        1. Parietal lobe infarction-continue aspirin, Lipitor, loop recorder placed  2. Sepsis ruled out-blood cultures have been negative, no repeat fever, ESR is normal, Doppler lower extremity results pending  3. Acute respiratory failure-resolved, patient is off oxygen, saturating 95% on room air, CT PE negative, no evidence of pneumonia  4. Multinodular goiter-patient follows up with PCP, had outpatient ultrasound recently. 5. Hepatic steatosis- stable, encourage weight loss  6. Encephalopathy- patient has h/o confusional episodes in the last month, recent LP negative 2 weeks, neurology planning LP gain tomorrow. Neuro taking over as primary team. Will continue to follow as a consult from medicine standpoint.       Arnaldo Watkins MD  7/22/2021  5:20 PM

## 2021-07-22 NOTE — PLAN OF CARE
Problem: Falls - Risk of:  Goal: Will remain free from falls  Description: Will remain free from falls  7/22/2021 1208 by William De Leon RN  Outcome: Met This Shift  7/22/2021 0524 by Franko Witt RN  Outcome: Ongoing  Goal: Absence of physical injury  Description: Absence of physical injury  7/22/2021 1208 by William De Leon RN  Outcome: Met This Shift  7/22/2021 0524 by Franko Witt RN  Outcome: Ongoing     Problem: HEMODYNAMIC STATUS  Goal: Patient has stable vital signs and fluid balance  7/22/2021 1208 by William De Leon RN  Outcome: Met This Shift  7/22/2021 0524 by Franko Witt RN  Outcome: Ongoing     Problem: ACTIVITY INTOLERANCE/IMPAIRED MOBILITY  Goal: Mobility/activity is maintained at optimum level for patient  7/22/2021 1208 by William De Leon RN  Outcome: Met This Shift  7/22/2021 0524 by Franko Witt RN  Outcome: Ongoing     Problem: COMMUNICATION IMPAIRMENT  Goal: Ability to express needs and understand communication  7/22/2021 [id number] by William De Leon RN  Outcome: Met This Shift  7/22/2021 0524 by Franko Witt RN  Outcome: Ongoing     Problem: Pain:  Goal: Pain level will decrease  Description: Pain level will decrease  7/22/2021 1208 by William De Leon RN  Outcome: Ongoing  7/22/2021 0524 by Franko Witt RN  Outcome: Ongoing  Goal: Control of acute pain  Description: Control of acute pain  7/22/2021 1208 by William De Leon RN  Outcome: Ongoing  7/22/2021 0524 by Franko Witt RN  Outcome: Ongoing  Goal: Control of chronic pain  Description: Control of chronic pain  Outcome: Ongoing

## 2021-07-22 NOTE — FLOWSHEET NOTE
07/22/21 1145   Encounter Summary   Services provided to: Patient   Referral/Consult From: Rounding   Support System Spouse; Children;Family members   [place] [phone] No   Continue Visiting   (7/22/21)   Complexity of Encounter Low   Length of Encounter 15 minutes   Spiritual Assessment Completed Yes   Routine   Type Initial   Assessment Approachable;Passive;Coping   Intervention Active listening;Explored feelings, thoughts, concerns;Nurtured hope;Prayer;Sustaining presence/ Ministry of presence; Discussed belief system/Rastafari practices/trice   Outcome Acceptance;Comfort;Expressed gratitude

## 2021-07-22 NOTE — PROCEDURES
[facility]  Transesophageal Echocardiogram       Today's Date:  7/22/2021  Indication:   CVA    Operators:  Primary:   Shy Stone MD (Attending Physician)  Assistant:   Stephanie Topete MD (Cardiovascular Fellow)      Procedure:    Patient seen and examined. History and Physical reviewed. Labs reviewed. After informed consent was obtained with explanation of the risks and benefits, the patient was brought to cardiac cath lab. All sedation was administered by the cardiologist. The oropharynx was pre-anesthesized with viscous lidocaine and cetacaine spray. The ultrasound probe was passed without any difficulty. DONOVAN findings:    LA:  Normal  SACHIN:  No thrombus  RA:  Normal  RV:   Normal  LV:  Normal  Estimated LVEF:  55%  Aorta:   Mild atheromatous disease arch  Pericardium: No pericardial effusion  Septum:  No intracardiac shunt via color Doppler. No intracardiac shunt via injection of agitated saline. Valves:    Mitral Valve: Structurally normal. No regurgitation is identified. Aortic Valve: The aortic valve is trileaflet and opens adequately. No regurgitiation is identified. Tricuspid valve: Structurally normal. No regurgitation is identified. Pulmonary valve: Normal. No significant regurgitation    No valvular vegetations or thrombus identified. Summary:     1. A DONOVAN was performed without complications. 2. LVEF 55%  3. No thrombus or valvular vegetation identified  4. No intracardiac shunt via color Doppler. No intracardiac shunt via injection of agitated saline. 5. There were no complications encountered. Proceed with ILR      Electronically signed by Stephanie Topete MD on 7/22/2021 at 11:33 AM  Cardiovascular Diseases Fellow  [facility]      Attending Physician Statement  I have discussed the case of Tejal Rosado including pertinent history and exam findings with the resident.  I have seen and examined the patient and the key elements of the encounter have been performed by me. I agree with the assessment, plan and orders as documented by the resident With changes made to the note.   I was present during entire procedure and performed all critical elements of the procedure    Electronically signed by Madelin Rodrigez MD on 7/22/2021 at 2:35 PM.    [facility]      [phone]

## 2021-07-22 NOTE — PROGRESS NOTES
Occupational Therapy  .   [facility])  Occupational Therapy Not Seen Note    DATE: 2021    NAME: Thomas Mueller  MRN: [id number]   : 1965      Patient not seen this date for Occupational Therapy due to:    Testing: dopplers ordered to rule out DVT    Next Scheduled Treatment: PM or 2021    Electronically signed by mW MCCARTY/S on 2021 at 9:43 AM

## 2021-07-22 NOTE — PROGRESS NOTES
[facility]   [facility]    Consult Note             Date:   7/22/2021  Patient name:  Jasper Holder  Date of admission:  7/20/2021 11:29 PM  MRN:   [id number]  Account:  [de-identified]  YOB: 1965  PCP:    No primary care provider on file. Room:   [address] [place] [address]  Code Status:    Full Code    Chief Complaint:   Altered mentation with receptive Aphasia     History Obtained From:     patient, electronic medical record    INTERVAL HISTORY    Pt states she feels like she has been doing well. She recently had her DONOVAN done 7/22/21 and is exhausted. Pt is on NPO status from midnight  7/21/21 for DONOVAN      History of Present Illness: The patient is a 64 y.o.  female who presents as a transfer from [facility] after becoming disoriented with receptive aphasia. Past medical history significant for recent right knee arthroscopy 7/19/2021 and over the last few months several episodes of confusion otherwise denies taking any daily medications or previous medical conditions. Patient noted to have NIH of 2 at outlying facility for aphasia and disorientation following right knee arthroscopy. On arrival exam is stable continues to have receptive aphasia with difficulty answering orientation questions. Patient denies any history of seizure or complicated migraine and was not previously on anticoagulation or antiplatelets. At outlying facility CT head without along with CTA head and neck completed were unremarkable for acute intracranial process or large vessel occlusion. Patient was noted to have incidental enlarged heterogeneous left-sided thyroid mass recommend correlation with thyroid ultrasound. Patient currently admitted under medicine service with general neurology consulted for further stroke work-up. Will get MRI brain without and routine 30-minute EEG.   Patient does not have previous cardiac echo and will also need complete cardiac echo with bubble study. Past Medical History:     Past Medical History:   Diagnosis Date    Lyme disease         Past Surgical History:     Past Surgical History:   Procedure Laterality Date    KNEE ARTHROSCOPY Right     7/21        Medications Prior to Admission:     Prior to Admission medications    Medication Sig Start Date End Date Taking? Authorizing Provider   aspirin 81 MG EC tablet Take 81 mg by mouth daily    Historical Provider, MD   fluticasone (FLONASE) 50 MCG/ACT nasal spray 1 spray by Each Nostril route daily    Historical Provider, MD   ferrous sulfate (IRON 325) 325 (65 Fe) MG tablet Take 325 mg by mouth daily (with breakfast)    Historical Provider, MD   Ascorbic Acid (VITAMIN C) 250 MG tablet Take 250 mg by mouth daily    Historical Provider, MD   methylphenidate (RITALIN) 10 MG tablet Take 20 mg by mouth 2 times daily. Historical Provider, MD   busPIRone (BUSPAR) 30 MG tablet Take 30 mg by mouth daily    Historical Provider, MD   citalopram (CELEXA) 10 MG tablet Take 10 mg by mouth daily    Historical Provider, MD   traZODone (DESYREL) 100 MG tablet Take 100 mg by mouth nightly    Historical Provider, MD   oxyCODONE-acetaminophen (PERCOCET) 5-325 MG per tablet Take 1 tablet by mouth every 4 hours as needed for Pain. Historical Provider, MD        Allergies:     Patient has no known allergies. Social History:     Tobacco:    reports that she has never smoked. She has never used smokeless tobacco.  Alcohol:      has no history on file for alcohol use. Drug Use:  has no history on file for drug use.     Family History:     Family History   Problem Relation Age of Onset   Radha Her Cancer Mother     Cancer Father        Review of Systems:     ROS:  Constitutional  Negative for fever and chills    HEENT  Negative for ear discharge, ear pain, nosebleed    Eyes  Negative for photophobia, pain and discharge    Respiratory  Negative for hemoptysis and sputum    Cardiovascular  Negative for orthopnea, claudication and PND    Gastrointestinal  Negative for abdominal pain, diarrhea, blood in stool    Musculoskeletal  Negative for joint pain, negative for myalgia    Neurology Negative for seizures, loss of consciousness   Skin  Negative for rash or itching    Endo/heme/allergies  Negative for polydipsia, environmental allergy    Psychiatric/behavioral  Negative for suicidal ideation. Patient is not anxious        Physical Exam:   /72   Pulse 65   Temp 98.4 °F (36.9 °C) (Oral)   Resp 19   Ht 5' 2\" (1.575 m)   Wt 212 lb 15.4 oz (96.6 kg)   SpO2 94%   BMI 38.95 kg/m²   Temp (24hrs), Av.2 °F (36.8 °C), Min:97.9 °F (36.6 °C), Max:98.4 °F (36.9 °C)    No results for input(s): POCGLU in the last 72 hours.     Intake/Output Summary (Last 24 hours) at 2021 0746  Last data filed at 2021  Gross per 24 hour   Intake 810 ml   Output --   Net 810 ml         NEUROLOGIC EXAMINATION  GENERAL  Appears comfortable and in no distress   HEENT  NC/ AT   NECK  Supple and no bruits heard   MENTAL STATUS:  Alert, oriented to person and age difficulty answering year, month and situation, speech is normal without dysarthria although noted to have receptive aphasia with paraphasic errors   CRANIAL NERVES: II     -      Visual fields intact to confrontation  III,IV,VI -  EOMs full, no afferent defect, no CHIQUITA, no ptosis  V     -     Normal facial sensation  VII    -     Normal facial symmetry  VIII   -     Intact hearing  IX,X -     Symmetrical palate  XI    -     Symmetrical shoulder shrug  XII   -     Midline tongue, no atrophy    MOTOR FUNCTION:  significant for good strength of grade 5/5 in bilateral proximal and distal muscle groups of both upper and lower extremities with normal bulk, normal tone and no involuntary movements, no tremor   SENSORY FUNCTION:  Normal touch, normal pin, normal vibration, normal proprioception   CEREBELLAR FUNCTION:  Intact fine motor control over upper limbs   REFLEX FUNCTION: Symmetric, no perverted reflex, no Babinski sign   STATION and GAIT  Not tested       Investigations:      Laboratory Testing:  Recent Results (from the past 24 hour(s))   EKG 12 Lead    Collection Time: 07/21/21 10:06 AM   Result Value Ref Range    Ventricular Rate 72 BPM    Atrial Rate 72 BPM    P-R Interval 170 ms    QRS Duration 98 ms    Q-T Interval 414 ms    QTc Calculation (Bazett) 453 ms    P Axis 44 degrees    R Axis -9 degrees    T Axis 9 degrees   Troponin    Collection Time: 07/21/21 10:55 AM   Result Value Ref Range    Troponin, High Sensitivity 47 (H) 0 - 14 ng/L    Troponin T NOT REPORTED <0.03 ng/mL    Troponin Interp NOT REPORTED    Comp Metabolic w Bili Profile    Collection Time: 07/22/21  5:55 AM   Result Value Ref Range    Albumin 3.5 3.5 - 5.2 g/dL    Albumin/Globulin Ratio 1.4 1.0 - 2.5    Alkaline Phosphatase 92 35 - 104 U/L    ALT 59 (H) 5 - 33 U/L    AST 27 <32 U/L    Total Bilirubin 0.30 0.3 - 1.2 mg/dL    Bilirubin, Direct 0.12 <0.31 mg/dL    Bilirubin, Indirect 0.18 0.00 - 1.00 mg/dL    BUN 16 6 - 20 mg/dL    Calcium 8.7 8.6 - 10.4 mg/dL    CREATININE 0.82 0.50 - 0.90 mg/dL    Glucose 96 70 - 99 mg/dL    Total Protein 6.0 (L) 6.4 - 8.3 g/dL    Sodium 142 135 - 144 mmol/L    Potassium 3.9 3.7 - 5.3 mmol/L    Chloride 103 98 - 107 mmol/L    CO2 28 20 - 31 mmol/L    Anion Gap 11 9 - 17 mmol/L    GFR Non-African American >60 >60 mL/min    GFR African American >60 >60 mL/min    GFR Comment          GFR Staging NOT REPORTED          DONOVAN results   Pt DONOVAN successful with no thrombus or valvular veg. No intracardiac shunt via doppler.    No intracardiac shunt via injection of agitated saline  Implementation of Loop recorder successful      Assessment :      Primary Problem  Parietal lobe infarction [facility])    Active Hospital Problems    Diagnosis Date Noted    Receptive aphasia [R47.01] 07/21/2021    Thyroid nodule [E04.1] 07/21/2021    Fever, unspecified [R50.9] 07/21/2021    Encephalopathy [G93.40] 07/21/2021    Bipolar disorder ([address] [F31.9] 07/21/2021    Hepatic steatosis [K76.0] 07/21/2021    Parietal lobe infarction [facility]) [I63.89] 07/21/2021    Multinodular goiter [E04.2] 07/21/2021       Plan:     Patient status Admit as inpatient in the  Progressive Unit/Step down    Assessment  1. Receptive aphasia concerning for left cortical ischemic stroke    Plan    DONOVAN outcome  Pt DONOVAN successful with no thrombus or valvular vegitation. No intracardiac shunt via doppler. No intracardiac shunt via injection of agitated saline  Implementation of Loop recorder successful    The plan was discussed with the patient, patient's family and the medical staff.      Marta Vazquez DPM   PGY-1  Neurology Service

## 2021-07-23 ENCOUNTER — APPOINTMENT (OUTPATIENT)
Dept: INTERVENTIONAL RADIOLOGY/VASCULAR | Age: 56
DRG: 040 | End: 2021-07-23
Attending: INTERNAL MEDICINE
Payer: COMMERCIAL

## 2021-07-23 VITALS
SYSTOLIC BLOOD PRESSURE: 131 MMHG | DIASTOLIC BLOOD PRESSURE: 87 MMHG | HEIGHT: 62 IN | WEIGHT: 212.96 LBS | HEART RATE: 74 BPM | OXYGEN SATURATION: 100 % | RESPIRATION RATE: 16 BRPM | BODY MASS INDEX: 39.19 KG/M2 | TEMPERATURE: 98.2 F

## 2021-07-23 LAB
ALBUMIN CSF: 101 MG/L (ref 70–350)
ALBUMIN INDEX: 24.6
ALBUMIN SERPL-MCNC: 4.1 G/DL (ref 3.5–5.2)
ANGIOTENSIN-CONVERTING ENZYME: 11 U/L (ref 8–52)
ANTI DNA DOUBLE STRANDED: 0.7 IU/ML
ANTI-NUCLEAR ANTIBODY (ANA): NEGATIVE
APPEARANCE CSF: CLEAR
AT-III ACTIVITY: 105 % (ref 83–122)
CRYPTOCOCCUS NEOFORMANS/GATTI CSF FILM ARR.: NOT DETECTED
CYTOMEGALOVIRUS (CMV) CSF FILM ARRAY: NOT DETECTED
DIRECT EXAM: ABNORMAL
ENA ANTIBODIES SCREEN: <0.1 U/ML
ENTEROVIRUS CSF FILM ARRAY: NOT DETECTED
ESCHERICHIA COLI K1 CSF FILM ARRAY: NOT DETECTED
FACTOR VII ACTIVITY: 88 % (ref 50–150)
FACTOR VIII ACTIVITY: 149 % (ref 50–150)
GLUCOSE, CSF: 64 MG/DL (ref 40–70)
HAEMOPHILUS INFLUENZA CSF FILM ARRAY: NOT DETECTED
HHV-6 (HERPESVIRUS 6) CSF FILM ARRAY: NOT DETECTED
HSV-1 CSF FILM ARRAY: NOT DETECTED
HSV-2 CSF FILM ARRAY: NOT DETECTED
IGG CSF: 0.7 MG/DL (ref 0.5–7)
IGG INDEX CSF: 0.54
IGG SYNTHESIS RATE CSF: < 3.3 MG/24 H
IGG: 527 MG/DL (ref 700–1600)
LISTERIA MONOCYTOGENES CSF FILM ARRAY: NOT DETECTED
Lab: ABNORMAL
NEISSERIA MENIGITIDIS CSF FILM ARRAY: NOT DETECTED
OLIGOCLONAL BANDS: ABNORMAL
PARECHOVIRUS CSF FILM ARRAY: NOT DETECTED
PROTEIN CSF: 21 MG/DL (ref 15–45)
RBC CSF: 12 /MM3
SPECIMEN DESCRIPTION: ABNORMAL
SPECIMEN DESCRIPTION: NORMAL
STREPTOCOCCUS AGALACTIAE CSF FILM ARRAY: NOT DETECTED
STREPTOCOCCUS PNEUMONIAE CSF FILM ARRAY: NOT DETECTED
SUPERNAT COLOR CSF: ABNORMAL
TUBE NUMBER CSF: 3
VARICELLA-ZOSTER CSF FILM ARRAY: NOT DETECTED
VDRL CSF SCREEN: NONREACTIVE
VOLUME CSF: ABNORMAL
WBC CSF: 0 /MM3
XANTHOCHROMIA: ABNORMAL

## 2021-07-23 PROCEDURE — 97535 SELF CARE MNGMENT TRAINING: CPT

## 2021-07-23 PROCEDURE — 36415 COLL VENOUS BLD VENIPUNCTURE: CPT

## 2021-07-23 PROCEDURE — 94761 N-INVAS EAR/PLS OXIMETRY MLT: CPT

## 2021-07-23 PROCEDURE — 97130 THER IVNTJ EA ADDL 15 MIN: CPT

## 2021-07-23 PROCEDURE — 97129 THER IVNTJ 1ST 15 MIN: CPT

## 2021-07-23 PROCEDURE — 2580000003 HC RX 258: Performed by: CLINICAL NURSE SPECIALIST

## 2021-07-23 PROCEDURE — 89050 BODY FLUID CELL COUNT: CPT

## 2021-07-23 PROCEDURE — 97530 THERAPEUTIC ACTIVITIES: CPT

## 2021-07-23 PROCEDURE — 97165 OT EVAL LOW COMPLEX 30 MIN: CPT

## 2021-07-23 PROCEDURE — 87205 SMEAR GRAM STAIN: CPT

## 2021-07-23 PROCEDURE — 99232 SBSQ HOSP IP/OBS MODERATE 35: CPT | Performed by: PSYCHIATRY & NEUROLOGY

## 2021-07-23 PROCEDURE — 99232 SBSQ HOSP IP/OBS MODERATE 35: CPT | Performed by: STUDENT IN AN ORGANIZED HEALTH CARE EDUCATION/TRAINING PROGRAM

## 2021-07-23 PROCEDURE — 82784 ASSAY IGA/IGD/IGG/IGM EACH: CPT

## 2021-07-23 PROCEDURE — 6370000000 HC RX 637 (ALT 250 FOR IP): Performed by: CLINICAL NURSE SPECIALIST

## 2021-07-23 PROCEDURE — 82040 ASSAY OF SERUM ALBUMIN: CPT

## 2021-07-23 PROCEDURE — 97162 PT EVAL MOD COMPLEX 30 MIN: CPT

## 2021-07-23 PROCEDURE — 83916 OLIGOCLONAL BANDS: CPT

## 2021-07-23 PROCEDURE — 86618 LYME DISEASE ANTIBODY: CPT

## 2021-07-23 PROCEDURE — 2709999900 HC NON-CHARGEABLE SUPPLY

## 2021-07-23 PROCEDURE — 82042 OTHER SOURCE ALBUMIN QUAN EA: CPT

## 2021-07-23 PROCEDURE — 009U3ZX DRAINAGE OF SPINAL CANAL, PERCUTANEOUS APPROACH, DIAGNOSTIC: ICD-10-PCS | Performed by: RADIOLOGY

## 2021-07-23 PROCEDURE — 82945 GLUCOSE OTHER FLUID: CPT

## 2021-07-23 PROCEDURE — 62328 DX LMBR SPI PNXR W/FLUOR/CT: CPT | Performed by: RADIOLOGY

## 2021-07-23 PROCEDURE — 82164 ANGIOTENSIN I ENZYME TEST: CPT

## 2021-07-23 PROCEDURE — 6370000000 HC RX 637 (ALT 250 FOR IP): Performed by: NURSE PRACTITIONER

## 2021-07-23 PROCEDURE — 84157 ASSAY OF PROTEIN OTHER: CPT

## 2021-07-23 PROCEDURE — 86592 SYPHILIS TEST NON-TREP QUAL: CPT

## 2021-07-23 PROCEDURE — 87483 CNS DNA AMP PROBE TYPE 12-25: CPT

## 2021-07-23 RX ORDER — ATORVASTATIN CALCIUM 40 MG/1
40 TABLET, FILM COATED ORAL NIGHTLY
Qty: 30 TABLET | Refills: 3 | Status: SHIPPED | OUTPATIENT
Start: 2021-07-23

## 2021-07-23 RX ADMIN — FERROUS SULFATE TAB EC 325 MG (65 MG FE EQUIVALENT) 325 MG: 325 (65 FE) TABLET DELAYED RESPONSE at 10:33

## 2021-07-23 RX ADMIN — Medication 81 MG: at 10:33

## 2021-07-23 RX ADMIN — FLUTICASONE PROPIONATE 1 SPRAY: 50 SPRAY, METERED NASAL at 10:36

## 2021-07-23 RX ADMIN — ACETAMINOPHEN 650 MG: 325 TABLET ORAL at 03:02

## 2021-07-23 RX ADMIN — Medication 250 MG: at 10:33

## 2021-07-23 RX ADMIN — SODIUM CHLORIDE, PRESERVATIVE FREE 10 ML: 5 INJECTION INTRAVENOUS at 10:42

## 2021-07-23 ASSESSMENT — PAIN SCALES - GENERAL
PAINLEVEL_OUTOF10: 5
PAINLEVEL_OUTOF10: 0

## 2021-07-23 ASSESSMENT — PAIN DESCRIPTION - LOCATION: LOCATION: OTHER (COMMENT)

## 2021-07-23 ASSESSMENT — PAIN DESCRIPTION - ORIENTATION: ORIENTATION: LEFT

## 2021-07-23 ASSESSMENT — PAIN DESCRIPTION - PAIN TYPE: TYPE: ACUTE PAIN

## 2021-07-23 ASSESSMENT — PAIN DESCRIPTION - FREQUENCY: FREQUENCY: INTERMITTENT

## 2021-07-23 ASSESSMENT — PAIN DESCRIPTION - DESCRIPTORS: DESCRIPTORS: ACHING

## 2021-07-23 NOTE — PLAN OF CARE
Problem: Falls - Risk of:  Goal: Will remain free from falls  Description: Will remain free from falls  7/23/2021 1830 by Javid Etienne RN  Outcome: Completed  7/23/2021 1244 by Javid Etienne RN  Outcome: Met This Shift  7/23/2021 0526 by Lexi Holland RN  Outcome: Ongoing  Goal: Absence of physical injury  Description: Absence of physical injury  7/23/2021 1830 by Javid Etienne RN  Outcome: Completed  7/23/2021 1244 by Javid Etienne RN  Outcome: Met This Shift  7/23/2021 0526 by Lexi Holland RN  Outcome: Ongoing     Problem: HEMODYNAMIC STATUS  Goal: Patient has stable vital signs and fluid balance  7/23/2021 1830 by Javid Etienne RN  Outcome: Completed  7/23/2021 1244 by Javid Etienne RN  Outcome: Met This Shift  7/23/2021 0526 by Lexi Holland RN  Outcome: Ongoing     Problem: ACTIVITY INTOLERANCE/IMPAIRED MOBILITY  Goal: Mobility/activity is maintained at optimum level for patient  7/23/2021 1830 by Javid Etienne RN  Outcome: Completed  7/23/2021 1244 by Javid Etienne RN  Outcome: Met This Shift  7/23/2021 0526 by Lexi Holland RN  Outcome: Ongoing     Problem: COMMUNICATION IMPAIRMENT  Goal: Ability to express needs and understand communication  7/23/2021 1830 by Javid Etienne RN  Outcome: Completed  7/23/2021 1244 by Javid Etienne RN  Outcome: Met This Shift  7/23/2021 0526 by Lexi Holland RN  Outcome: Ongoing     Problem: Pain:  Goal: Pain level will decrease  Description: Pain level will decrease  7/23/2021 1830 by Javid Etienne RN  Outcome: Completed  7/23/2021 1244 by Javid Etienne RN  Outcome: Met This Shift  7/23/2021 0526 by Lexi Holland RN  Outcome: Ongoing  Goal: Control of acute pain  Description: Control of acute pain  7/23/2021 1830 by Javid Etienne RN  Outcome: Completed  7/23/2021 1244 by Javdi Etienne RN  Outcome: Met This Shift  7/23/2021 0526 by Lexi Holland RN  Outcome: Ongoing  Goal: Control of chronic pain  Description: Control of chronic pain  7/23/2021 1830 by Javid Etienne RN  Outcome: Completed  7/23/2021 1244 by Thea Earl RN  Outcome: Met This Shift

## 2021-07-23 NOTE — PROGRESS NOTES
[facility]  Office: [address], DO, Nerissa Pickering, DO, Robert Jazmín, DO, Medhat Holley Blood, DO, Maynor Jaime MD, Adis Panda MD, Greg Larios MD, Ady Valera MD, Dakotah Corbett MD, Hamzah iDnh MD, Trinidad Reynoso MD, Zoey Chaparro, DO, Mel Castrejon MD, Finn Loja, DO, Carmen Petty MD,  Frances Dean, DO, Shay Mendiola MD, Richie Bell MD, Keisha Tesfaye MD, Jigar Macdonald MD, Yasmine Dougherty MD, Gilbert Gaytan MD, Romi Hahn, [facility], [facility], CNP, Richi Morales, CNP, Chiquis Nevarez CNS, Burney Price, Delayne Epley, CNP, Annie Rodriguez, CNP, Justus Loja, CNP, Hortensia Adler, CNP, Betsy Zelaya PA-C, Luis F Dey, EILEEN, James Rodríguez, CNP, Osiel Batista, CNP, Saravanan Leblanc, CNP, Edilia Walker, CNP, Barbra Prader, CNP, Alyssa Darby, CNP, Kathy Lu, [facility], Pooja Hicks, [address] [place] [address]    Progress Note    7/23/2021    4:04 PM    Name:   Quan Rodriguez  MRN:     [id number]     Acct:      [de-identified]   Room:   [address] [place] [address] Day:  3  Admit Date:  7/20/2021 11:29 PM    PCP:   MARÍA Fitzgerald CNP  Code Status:  Full Code    Subjective:     C/C: Word finding difficulty  Interval History Status: improved. No acute events overnight  Blood pressure continues to be stable  Patient continues on room air  Afebrile  LP done as per neurology  Brief History:     59-year-old female with known medical history of depression, hepatic steatosis, hypertension, presented with expressive aphasia. Seen to have acute infarct in the left parietal lobe on the MRI scan.   [facility] negative for pulmonary embolism, no evidence of pneumonia    Review of Systems:     Constitutional:  negative for chills, fevers, sweats  Respiratory:  negative for cough, dyspnea on exertion, shortness of breath, wheezing  Cardiovascular:  negative for chest pain, chest pressure/discomfort, lower extremity edema, palpitations  Gastrointestinal:  negative for abdominal pain, constipation, diarrhea, nausea, vomiting  Neurological:  Minimal word finding difficulty, negative for dizziness, headache    Medications: Allergies:  No Known Allergies    Current Meds:   Scheduled Meds:    vitamin C  250 mg Oral Daily    aspirin  81 mg Oral Daily    ferrous sulfate  325 mg Oral Daily with breakfast    fluticasone  1 spray Each Nostril Daily    [Held by provider] traZODone  100 mg Oral Nightly    sodium chloride flush  10 mL Intravenous 2 times per day    atorvastatin  40 mg Oral Nightly     Continuous Infusions:    sodium chloride       PRN Meds: acetaminophen, sodium chloride flush, sodium chloride flush, sodium chloride, ondansetron **OR** ondansetron, magnesium hydroxide    Data:     Past Medical History:   has a past medical history of Lyme disease. Social History:   reports that she has never smoked. She has never used smokeless tobacco.     Family History:   Family History   Problem Relation Age of Onset   Jeevan Spina Cancer Mother     Cancer Father        Vitals:  /87   Pulse 74   Temp 98.2 °F (36.8 °C) (Oral)   Resp 16   Ht 5' 2\" (1.575 m)   Wt 212 lb 15.4 oz (96.6 kg)   SpO2 100%   BMI 38.95 kg/m²   Temp (24hrs), Av.1 °F (36.7 °C), Min:97.9 °F (36.6 °C), Max:98.2 °F (36.8 °C)    No results for input(s): POCGLU in the last 72 hours. I/O (24Hr):     Intake/Output Summary (Last 24 hours) at 2021 1604  Last data filed at 2021 2226  Gross per 24 hour   Intake 10 ml   Output --   Net 10 ml       Labs:  Hematology:  Recent Labs     21  1624 21  0439 21  0630 21  1731   WBC 12.5* 10.2 11.3  --    RBC 4.14 3.82* 3.73*  --    HGB 12.5 11.2* 11.0*  --    HCT 38.9 36.6 35.6*  --    MCV 94.0 95.8 95.4  --    MCH 30.2 29.3 29.5  --    MCHC 32.1 30.6 30.9  --    RDW 13.7 13.7 13.7  --     235 231  --    MPV 9.4 9.7 9.7  -- SEDRATE  --  19  --  19   CRP  --  71.7*  --   --    INR 1.2  --   --   --      Chemistry:  Recent Labs     07/20/21  1624 07/20/21  1624 07/20/21  1750 07/21/21  0439 07/21/21  0630 07/21/21  1055 07/22/21  0555     --   --  136  --   --  142   K 4.5  --   --  4.2  --   --  3.9     --   --  102  --   --  103   CO2 26  --   --  23  --   --  28   GLUCOSE 129*  --   --  102*  --   --  96   BUN 24*  --   --  17  --   --  16   CREATININE 1.14*  --   --  0.92*  --   --  0.82   ANIONGAP 12  --   --  11  --   --  11   LABGLOM 49*  --   --  >60  --   --  >60   GFRAA 60*  --   --  >60  --   --  >60   CALCIUM 8.6  --   --  8.4*  --   --  8.7   PROBNP  --   --  858*  --   --   --   --    TROPHS 49*   < > 49*  --  56* 47*  --    LACTACIDWB NOT REPORTED  --   --   --   --   --   --     < > = values in this interval not displayed. Recent Labs     07/20/21  1750 07/21/21  0439 07/21/21  0630 07/22/21  0555 07/23/21  1026   PROT  --  6.0*  --  6.0*  --    LABALBU  --  3.5  --  3.5 4.1   LABA1C  --  6.0  --   --   --    TSH 0.90  --   --   --   --    AST  --  64*  --  27  --    ALT  --  87*  --  59*  --    ALKPHOS  --  111*  --  92  --    BILITOT  --  0.41  --  0.30  --    BILIDIR  --   --   --  0.12  --    AMMONIA  --   --  53*  --   --    CHOL  --  171  --   --   --    HDL  --  53  --   --   --    LDLCHOLESTEROL  --  97  --   --   --    CHOLHDLRATIO  --  3.2  --   --   --    TRIG  --  103  --   --   --    VLDL  --  NOT REPORTED  --   --   --      ABG:No results found for: POCPH, PHART, PH, POCPCO2, UPG0NWK, PCO2, POCPO2, PO2ART, PO2, POCHCO3, OAC9AMT, HCO3, NBEA, PBEA, BEART, BE, THGBART, THB, IWQ6POV, PPIV7YQD, N2DVNFRN, O2SAT, FIO2  Lab Results   Component Value Date/Time    SPECIAL NOT REPORTED 07/23/2021 11:28 AM     Lab Results   Component Value Date/Time    CULTURE NO GROWTH 3 DAYS 07/20/2021 04:45 PM       Radiology:  CT Head WO Contrast    Result Date: 7/20/2021  No acute intracranial abnormality. Findings discussed via telephone with Dr. Debbie Kern on 07/20/2021 at 4:05 p.m. XR CHEST PORTABLE    Result Date: 7/20/2021  No acute process. CT CHEST PULMONARY EMBOLISM W CONTRAST    Result Date: 7/20/2021  No evidence of pulmonary embolism or acute pulmonary abnormality. Thyroid nodules, amenable to further evaluation by ultrasound. Marked hepatic steatosis. CTA HEAD NECK W CONTRAST    Result Date: 7/20/2021  1. No evidence of large vessel occlusion, significant stenosis, dissection, or aneurysmal dilation. 2. Incidentally noted predominately left-sided enlarged heterogeneous thyroid which may represent multinodular goiter. Recommend correlation with thyroid ultrasound. MRI BRAIN W WO CONTRAST    Addendum Date: 7/21/2021    ADDENDUM: Findings discussed via telephone with Dr. Areli Harkins on 07/21/2021 at 1:44 p.m. Raven Tubbs Result Date: 7/21/2021  1. Acute infarct centered in the left parietal lobe. No evidence of hemorrhagic transformation. 2. No suspicious pachymeningeal or leptomeningeal enhancement or thickening.        Physical Examination:        General appearance:  alert, cooperative and no distress  Mental Status:  oriented to person, place and time and normal affect  Lungs:  clear to auscultation bilaterally, normal effort  Heart:  regular rate and rhythm, no murmur, loop recorder placed, site clean, no sokage of dressing  Abdomen:  soft, nontender, nondistended, normal bowel sounds, no masses, hepatomegaly, splenomegaly  Extremities:  no edema, redness, tenderness in the calves  Skin:  no gross lesions, rashes, induration  Neurological-expressive aphasia minimal    Assessment:        Hospital Problems         Last Modified POA    * (Principal) Parietal lobe infarction ([address] 7/21/2021 Yes    Receptive aphasia 7/21/2021 Yes    Thyroid nodule 7/21/2021 Yes    Fever, unspecified 7/21/2021 Yes    Encephalopathy 7/21/2021 Yes    Bipolar disorder ([address] 7/21/2021 Yes    Hepatic steatosis 7/21/2021 Yes Multinodular goiter 7/21/2021 Yes    Cerebral infarction ([address] 7/22/2021 Yes                Plan:        1. Parietal lobe infarction-continue aspirin, Lipitor, loop recorder placed, follow up in [facility] clinic in 7 days. 2. Sepsis ruled out-blood cultures have been negative, no repeat fever, ESR is normal, Doppler lower extremity results negative  3. Acute respiratory failure-resolved, patient is off oxygen, saturating 95% on room air, CT PE negative, no evidence of pneumonia  4. Multinodular goiter-patient follows up with PCP, had outpatient ultrasound recently. 5. Hepatic steatosis- stable, encourage weight loss  6. Encephalopathy- patient has h/o confusional episodes in the last month, recent LP negative 2 weeks, LP done today, fluid studies sent    Neuro taking over as primary team. Will continue to follow as a consult from medicine standpoint. okay to discharge from medicine stand point.     Paulina Pérez MD  7/23/2021  4:04 PM

## 2021-07-23 NOTE — PROGRESS NOTES
[facility]   [facility]    Consult Note             Date:   7/23/2021  Patient name:  Renita Hamilton  Date of admission:  7/20/2021 11:29 PM  MRN:   [id number]  Account:  [de-identified]  YOB: 1965  PCP:    Connie Cheadle, APRN - CNP  Room:   [address] [place] [address]  Code Status:    Full Code    Chief Complaint:   Altered mentation with receptive Aphasia     History Obtained From:     patient, electronic medical record    INTERVAL HISTORY  Pt is 65 yo female who is here for encephalopathy. Pt states she has been doing well with improvement with fluent language with minor word difficulty with normal comprehension with non focal motor, sensory exam.     History of Present Illness: The patient is a 64 y.o.  female who presents as a transfer from [facility] after becoming disoriented with receptive aphasia. Past medical history significant for recent right knee arthroscopy 7/19/2021 and over the last few months several episodes of confusion otherwise denies taking any daily medications or previous medical conditions. Patient noted to have NIH of 2 at outlying facility for aphasia and disorientation following right knee arthroscopy. On arrival exam is stable continues to have receptive aphasia with difficulty answering orientation questions. Patient denies any history of seizure or complicated migraine and was not previously on anticoagulation or antiplatelets. At outlying facility CT head without along with CTA head and neck completed were unremarkable for acute intracranial process or large vessel occlusion. Patient was noted to have incidental enlarged heterogeneous left-sided thyroid mass recommend correlation with thyroid ultrasound. Patient currently admitted under medicine service with general neurology consulted for further stroke work-up. Will get MRI brain without and routine 30-minute EEG.   Patient does not have previous cardiac echo and will also need complete cardiac echo with bubble study. Past Medical History:     Past Medical History:   Diagnosis Date    Lyme disease         Past Surgical History:     Past Surgical History:   Procedure Laterality Date    KNEE ARTHROSCOPY Right     7/21        Medications Prior to Admission:     Prior to Admission medications    Medication Sig Start Date End Date Taking? Authorizing Provider   aspirin 81 MG EC tablet Take 81 mg by mouth daily    Historical Provider, MD   fluticasone (FLONASE) 50 MCG/ACT nasal spray 1 spray by Each Nostril route daily    Historical Provider, MD   ferrous sulfate (IRON 325) 325 (65 Fe) MG tablet Take 325 mg by mouth daily (with breakfast)    Historical Provider, MD   Ascorbic Acid (VITAMIN C) 250 MG tablet Take 250 mg by mouth daily    Historical Provider, MD   methylphenidate (RITALIN) 10 MG tablet Take 20 mg by mouth 2 times daily. Historical Provider, MD   busPIRone (BUSPAR) 30 MG tablet Take 30 mg by mouth daily    Historical Provider, MD   citalopram (CELEXA) 10 MG tablet Take 10 mg by mouth daily    Historical Provider, MD   traZODone (DESYREL) 100 MG tablet Take 100 mg by mouth nightly    Historical Provider, MD   oxyCODONE-acetaminophen (PERCOCET) 5-325 MG per tablet Take 1 tablet by mouth every 4 hours as needed for Pain. Historical Provider, MD        Allergies:     Patient has no known allergies. Social History:     Tobacco:    reports that she has never smoked. She has never used smokeless tobacco.  Alcohol:      has no history on file for alcohol use. Drug Use:  has no history on file for drug use.     Family History:     Family History   Problem Relation Age of Onset   Gloriajean Seeds Cancer Mother     Cancer Father        Review of Systems:     ROS:  Constitutional  Negative for fever and chills    HEENT  Negative for ear discharge, ear pain, nosebleed    Eyes  Negative for photophobia, pain and discharge    Respiratory  Negative for hemoptysis and sputum    Cardiovascular  Negative for orthopnea, claudication and PND    Gastrointestinal  Negative for abdominal pain, diarrhea, blood in stool    Musculoskeletal  Negative for joint pain, negative for myalgia    Neurology Negative for seizures, loss of consciousness   Skin  Negative for rash or itching    Endo/heme/allergies  Negative for polydipsia, environmental allergy    Psychiatric/behavioral  Negative for suicidal ideation. Patient is not anxious        Physical Exam:   /78   Pulse 66   Temp 98.2 °F (36.8 °C) (Oral)   Resp 16   Ht 5' 2\" (1.575 m)   Wt 212 lb 15.4 oz (96.6 kg)   SpO2 94%   BMI 38.95 kg/m²   Temp (24hrs), Av.1 °F (36.7 °C), Min:97.9 °F (36.6 °C), Max:98.2 °F (36.8 °C)    No results for input(s): POCGLU in the last 72 hours.     Intake/Output Summary (Last 24 hours) at 2021 1012  Last data filed at 2021 2226  Gross per 24 hour   Intake 10 ml   Output --   Net 10 ml         NEUROLOGIC EXAMINATION  GENERAL  Appears comfortable and in no distress   HEENT  NC/ AT   NECK  Supple and no bruits heard   MENTAL STATUS:  Alert, oriented to person and age difficulty answering year, month and situation, speech is normal without dysarthria although noted to have receptive aphasia with paraphasic errors   CRANIAL NERVES: II     -      Visual fields intact to confrontation  III,IV,VI -  EOMs full, no afferent defect, no CHIQUITA, no ptosis  V     -     Normal facial sensation  VII    -     Normal facial symmetry  VIII   -     Intact hearing  IX,X -     Symmetrical palate  XI    -     Symmetrical shoulder shrug  XII   -     Midline tongue, no atrophy    MOTOR FUNCTION:  significant for good strength of grade 5/5 in bilateral proximal and distal muscle groups of both upper and lower extremities with normal bulk, normal tone and no involuntary movements, no tremor   SENSORY FUNCTION:  Normal touch, normal pin, normal vibration, normal proprioception   CEREBELLAR FUNCTION:  Intact fine motor control over upper limbs   REFLEX FUNCTION:  Symmetric, no perverted reflex, no Babinski sign   STATION and GAIT  Not tested       Investigations:      Laboratory Testing:  Recent Results (from the past 24 hour(s))   Homocysteine, Serum    Collection Time: 07/22/21  5:31 PM   Result Value Ref Range    Homocysteine 10.2 <15.0 umol/L   VITAMIN B12 & FOLATE    Collection Time: 07/22/21  5:31 PM   Result Value Ref Range    Vitamin B-12 438 232 - 1245 pg/mL    Folate 12.0 >4.8 ng/mL   Sedimentation Rate    Collection Time: 07/22/21  5:31 PM   Result Value Ref Range    Sed Rate 19 0 - 30 mm         DONOVAN results   Pt DONOVAN successful with no thrombus or valvular veg. No intracardiac shunt via doppler. No intracardiac shunt via injection of agitated saline  Implementation of Loop recorder successful      Assessment :      Primary Problem  Parietal lobe infarction [facility])    Active Hospital Problems    Diagnosis Date Noted    Cerebral infarction ([facility] [I63.9] 07/22/2021    Receptive aphasia [R47.01] 07/21/2021    Thyroid nodule [E04.1] 07/21/2021    Fever, unspecified [R50.9] 07/21/2021    Encephalopathy [G93.40] 07/21/2021    Bipolar disorder ([facility] [F31.9] 07/21/2021    Hepatic steatosis [K76.0] 07/21/2021    Parietal lobe infarction [facility]) [I63.89] 07/21/2021    Multinodular goiter [E04.2] 07/21/2021       Plan:     Patient status Admit as inpatient in the  Progressive Unit/Step down    Assessment  1. Encephalopathy   2. Receptive aphasia concerning for left cortical ischemic stroke    Plan    LP planned today and IR notified  PT/OT assessment needed  Awaiting Coagulpathy workup  Possible d/c 7/24/2021    The plan was discussed with the patient, patient's family and the medical staff.      Valerie Garcia DPM   PGY-1  Neurology Service

## 2021-07-23 NOTE — DISCHARGE SUMMARY
[facility]     Department of Neurology    INPATIENT DISCHARGE SUMMARY        Patient Identification:  Simone De Oliveira is a 64 y.o. female. :  1965  MRN: [id number]     Acct: [de-identified]   Admit Date:  2021  Discharge date and time: No discharge date for patient encounter. Attending Provider: Carol Alvarez, *                                     Admission Diagnoses:   TIA (transient ischemic attack) [G45.9]    Discharge Diagnoses:   Principal Problem:    Parietal lobe infarction [facility])  Active Problems:    Receptive aphasia    Thyroid nodule    Fever, unspecified    Encephalopathy    Bipolar disorder (HCC)    Hepatic steatosis    Multinodular goiter    Cerebral infarction [facility])  Resolved Problems:    * No resolved hospital problems. *       Consults:   none    Brief Inpatient course: The patient is a 64 y.o.  female who presented as a transfer from [facility] after becoming disoriented with receptive aphasia. Past medical history significant for recent right knee arthroscopy 2021 and over the last few months several episodes of confusion otherwise denied taking any daily medications or previous medical conditions. [facility] along with CTA head and neck completed were unremarkable for acute intracranial process or large vessel occlusion. Patient was noted to have incidental enlarged heterogeneous left-sided thyroid mass recommend correlation with thyroid ultrasound. Patient underwent MRI brain on  which showed acute left parietal infarct, no evidence of hemorraghic transformation. She underwent DONOVAN on 21 which showed LVEF 55%, no valvular vegetations or thrombus, no intracardiac shunt. Loop recorder was also placed on 21.  ON 21 patietn underwent IR guided LP of L2-L3 and CSF sample was

## 2021-07-23 NOTE — CARE COORDINATION
Discharge [facility] Case Management Department  Written by: Mercedes Philip RN    Patient Name: Juvencio Catherine  Attending Provider: Flavio Madera, *  Admit Date: 2021 11:29 PM  MRN: [id number]  Account: [de-identified]                     : 1965  Discharge Date:  21        Disposition: home independent; has transportation. No needs.      Mercedes Philip RN

## 2021-07-23 NOTE — PROGRESS NOTES
[facility]  Progress Note                   Date:   7/23/2021  Patient name: Keyur Mejía  Date of admission:  7/20/2021 11:29 PM  MRN:   [id number]  YOB: 1965  PCP: MARÍA Leyva CNP    Reason for Admission: TIA (transient ischemic attack) [G45.9]    Subjective:       Clinical Changes /Abnormalities: no acute CV issues/concerns overnight. Loop in place. DONOVAN noted. Review of Systems    Medications:   Scheduled Meds:   vitamin C  250 mg Oral Daily    aspirin  81 mg Oral Daily    ferrous sulfate  325 mg Oral Daily with breakfast    fluticasone  1 spray Each Nostril Daily    [Held by provider] traZODone  100 mg Oral Nightly    sodium chloride flush  10 mL Intravenous 2 times per day    atorvastatin  40 mg Oral Nightly     Continuous Infusions:   sodium chloride       CBC:   Recent Labs     07/20/21  1624 07/21/21  0439 07/21/21  0630   WBC 12.5* 10.2 11.3   HGB 12.5 11.2* 11.0*    235 231     BMP:    Recent Labs     07/20/21  1624 07/21/21  0439 07/22/21  0555    136 142   K 4.5 4.2 3.9    102 103   CO2 26 23 28   BUN 24* 17 16   CREATININE 1.14* 0.92* 0.82   GLUCOSE 129* 102* 96     Hepatic:  Recent Labs     07/21/21  0439 07/22/21  0555   AST 64* 27   ALT 87* 59*   BILITOT 0.41 0.30   ALKPHOS 111* 92     Troponin:   Recent Labs     07/20/21  1750 07/21/21  0630 07/21/21  1055   TROPHS 49* 56* 47*     BNP: No results for input(s): BNP in the last 72 hours. Lipids:   Recent Labs     07/21/21  0439   CHOL 171   HDL 53     INR:   Recent Labs     07/20/21  1624   INR 1.2       Objective:   Vitals: /87   Pulse 74   Temp 98.2 °F (36.8 °C) (Oral)   Resp 16   Ht 5' 2\" (1.575 m)   Wt 212 lb 15.4 oz (96.6 kg)   SpO2 100%   BMI 38.95 kg/m²   General appearance: alert and cooperative with exam  HEENT: Head: Normocephalic, no lesions, without obvious abnormality.   Neck:no JVD, trachea midline, no adenopathy  Lungs: Clear to auscultation  Heart: Regular rate and rhythm, s1/s2 auscultated, no murmurs  Abdomen: soft, non-tender, bowel sounds active  Extremities: no edema  Neurologic: not done    DONOVAN 7/22/21  Summary  A DONOVAN was performed without complications. The study was performed by the attending, fellow, and the sonographer. LVEF 55%. No valvular vegetations or thrombus identified. No intracardiac shunt via color Doppler. No intracardiac shunt via injection  of agitated saline. Loop recorder placed 7/22/21      Assessment / Acute Cardiac Problems:   1. Elevated trop - demand ischemia from anemia & ISABELLA  2. TIA  3. Thyroid nodule    Patient Active Problem List:     Receptive aphasia     Thyroid nodule     Fever, unspecified     Encephalopathy     Bipolar disorder (HCC)     Hepatic steatosis     Parietal lobe infarction ([address]     Multinodular goiter     Cerebral infarction ([address]      Plan of Treatment:   1. DONOVAN reviewed. Loop placed without complications. 2. No further CV interventions at this time. Will sign off. Please call with questions/concerns.      Electronically signed by MARÍA Dale CNP on 7/23/2021 at 2:26 PM  [address]  [phone]

## 2021-07-23 NOTE — PROGRESS NOTES
Physical Therapy    Facility/Department: [facility] NEURO  Initial Assessment    NAME: Yvonne Wisdmo  : 1965  MRN: [id number]  Acute L parietal CVA  Date of Service: 2021    Discharge Recommendations: Further OP therapy recommended at discharge. PT Equipment Recommendations  Equipment Needed: No    Assessment   Assessment: The pt ambulated 180ft Mod I, mildly antalgic following recent meniscus repair. Recommend continued outpatient PT to progress gait, pt with no acute PT needs. Educated pt on d/c from acute PT and pt verbalized agreement and understanding. Prognosis: Good  Decision Making: Medium Complexity  PT Education: Goals;PT Role;Plan of Care  REQUIRES PT FOLLOW UP: No  Activity Tolerance  Activity Tolerance: Patient Tolerated treatment well       Patient Diagnosis(es): There were no encounter diagnoses. has a past medical history of Lyme disease. has a past surgical history that includes Knee arthroscopy (Right). Restrictions  Restrictions/Precautions  Restrictions/Precautions: Up as Tolerated  Required Braces or Orthoses?: No  Position Activity Restriction  Other position/activity restrictions: expressive aphasia, recent R meniscus repair- pt reports no known restrictions  Vision/Hearing  Vision: Impaired  Vision Exceptions: Wears glasses for reading  Hearing: Within functional limits     Subjective  General  Patient assessed for rehabilitation services?: Yes  Response To Previous Treatment: Not applicable  Family / Caregiver Present: No  Follows Commands: Within Functional Limits  Subjective  Subjective: RN and pt agreeable to PT. Pt supine in bed upon arrival, very pleasant and cooperative throughout. Occasional expressive aphasia noted throughout, pt reports significant improvement since admission.   Pain Screening  Patient Currently in Pain: Denies  Vital Signs  Patient Currently in Pain: Denies       Orientation  Orientation  Overall Orientation Status: Within Functional Limits  Social/Functional History  Social/Functional History  Lives With: Spouse  Type of Home: House  Home Layout: One level  Home Access: Stairs to enter without rails  Entrance Stairs - Number of Steps: 1  Bathroom Shower/Tub: Walk-in shower, Shower chair without back  Bathroom Toilet: Standard  Bathroom Equipment: Hand-held shower  Home Equipment:  (no DME)  Receives Help From: Family  ADL Assistance: Independent  Homemaking Assistance: Independent  Homemaking Responsibilities: Yes  Ambulation Assistance: Independent  Transfer Assistance: Independent  Active : Yes  Mode of Transportation: [facility]  Occupation: Full time employment  Type of occupation: Medical office  Leisure & Hobbies: grandkids, boating  Additional Comments: Pt reports  works in the afternoon, otherwise able to assist.  Cognition   Cognition  Overall Cognitive Status: WFL    Objective          Joint Mobility  Spine: WFL  ROM RLE: WFL  ROM LLE: WFL  ROM RUE: WFL  ROM LUE: WFL  Strength RLE  Strength RLE: WFL  Comment: knee not formally assessed due to pain following recent meniscus repair  Strength LLE  Strength LLE: WFL  Strength RUE  Strength RUE: WFL  Strength LUE  Strength LUE: WFL  Tone RLE  RLE Tone: Normotonic  Tone LLE  LLE Tone: Normotonic  Motor Control  Gross Motor?: WFL  Sensation  Overall Sensation Status: WFL  Bed mobility  Supine to Sit: Independent  Sit to Supine: Independent  Scooting: Independent  Transfers  Sit to Stand: Independent  Stand to sit:  Independent  Comment: performed 4x throughout  Ambulation  Ambulation?: Yes  Ambulation 1  Surface: level tile  Device: No Device  Assistance: Modified Independent  Quality of Gait: antalgic, decreased R stance, no LOB  Gait Deviations: Slow [place]  Distance: 180ft  Comments: Pt reports baseline gait following recent meniscus repair  Stairs/Curb  Stairs?: No     Balance  Posture: Good  Sitting - Static: Good  Sitting - Dynamic: Good  Standing - Static: Good  Standing - Dynamic: Good;-  Comments: standing balance assessed without AD        Plan   Plan  Times per week: d/c PT  Safety Devices  Type of devices: Nurse notified, Call light within reach, Gait belt, Left in chair  Restraints  Initially in place: No      AM-PAC Score  AM-PAC Inpatient Mobility Raw Score : 24 (07/23/21 1213)  AM-PAC Inpatient T-Scale Score : 61.14 (07/23/21 1213)  Mobility Inpatient CMS 0-100% Score: 0 (07/23/21 1213)  Mobility Inpatient CMS G-Code Modifier : [address] (07/23/21 1213)        Therapy Time   Individual Concurrent Group Co-treatment   Time In 0946         Time Out 1005         Minutes 19         Timed Code Treatment Minutes: 8 Minutes       Juan Francisco Piedra, PT

## 2021-07-23 NOTE — PROGRESS NOTES
Occupational Therapy   Occupational Therapy Initial Assessment  Date: 2021   Patient Name: Fabby Foss  MRN: [id number]     : 1965      Altered Mental Status       pt has had an altered mental status at home, pt was given Narcan via EMS PTA. Pt had a recent knee scope          Date of Service: 2021    Discharge Recommendations:    No therapy recommended at discharge. OT Equipment Recommendations  Equipment Needed: No    Assessment   Assessment: Pt agreeable to OT eval this date. Pt presents with mild expressive aphasia. Pt completed UB and LB ADLs independently. Pt completed functional transfers independently and functional mobility with SUP for safety only. Pt reports no concerns with indepedently completing ADLs/IADLs at discharge,  Prognosis: Good  Decision Making: Low Complexity  Patient Education: Pt ed on OT role, OT POC, safety awareness; good return  No Skilled OT: Independent with ADL's;No OT goals identified  REQUIRES OT FOLLOW UP: No  Activity Tolerance  Activity Tolerance: Patient Tolerated treatment well  Safety Devices  Safety Devices in place: Yes  Type of devices: Left in chair;Gait belt;Call light within reach (left in room with PT)  Restraints  Initially in place: No           Patient Diagnosis(es): There were no encounter diagnoses. has a past medical history of Lyme disease. has a past surgical history that includes Knee arthroscopy (Right).            Restrictions  Restrictions/Precautions  Restrictions/Precautions: Up as Tolerated  Required Braces or Orthoses?: No  Position Activity Restriction  Other position/activity restrictions: expressive aphasia, recent R meniscus repair- pt reports no known restrictions    Subjective   General  Patient assessed for rehabilitation services?: Yes  Family / Caregiver Present: No  Patient Currently in Pain: Denies  Vital Signs  Patient Currently in Pain: Denies     Social/Functional History  Social/Functional History  Lives With: Spouse  Type of Home: House  Home Layout: One level  Home Access: Stairs to enter without rails  Entrance Stairs - Number of Steps: 1  Bathroom Shower/Tub: Walk-in shower, Shower chair without back  Bathroom Toilet: Standard  Bathroom Equipment: Hand-held shower  Home Equipment:  (no DME)  Receives Help From: Family  ADL Assistance: Independent  Homemaking Assistance: Independent  Homemaking Responsibilities: Yes  Ambulation Assistance: Independent  Transfer Assistance: Independent  Active : Yes  Mode of Transportation: SUV  Occupation: Full time employment  Type of occupation: Medical office  [address] [place] [address] Avenue: [employer]  Additional Comments: Pt reports  works in the afternoon, otherwise able to assist.       Objective   Vision: Impaired  Vision Exceptions: Wears glasses for reading  Hearing: Within functional limits          Balance  Sitting Balance: Independent  Standing Balance: Independent  Standing Balance  Time: ~2 min at EOB  Functional Mobility  Functional - Mobility Device: No device  Activity: Other  Assist Level: Supervision  Functional Mobility Comments: pt completed functional mobility within hospital room and hallway with supervision for safety only  ADL  Feeding: Independent  Grooming: Independent  UE Bathing: Independent  LE Bathing: Independent  UE Dressing: Independent (donned gown to back independently)  LE Dressing: Independent (donned B socks independently)  Toileting: Independent  Tone RUE  RUE Tone: Normotonic  Tone LUE  LUE Tone: Normotonic  Coordination  Movements Are Fluid And Coordinated: Yes     Bed mobility  Supine to Sit: Independent  Sit to Supine: Independent  Scooting: Independent  Transfers  Sit to stand: Independent  Stand to sit:  Independent  Transfer Comments: completed 2X     Cognition  Overall Cognitive Status: WFL        Sensation  Overall Sensation Status: WFL        LUE AROM (degrees)  LUE AROM : WFL  Left Hand AROM (degrees)  Left Hand AROM: WFL  RUE AROM (degrees)  RUE AROM : WFL  Right Hand AROM (degrees)  Right Hand AROM: WFL  LUE Strength  Gross LUE Strength: WFL  L Hand General: 5/5  LUE Strength Comment: grossly 5/5  RUE Strength  Gross RUE Strength: WFL  R Hand General: 5/5  RUE Strength Comment: grossly 5/5                   AM-PAC Score        AM-[facility] Inpatient Daily Activity Raw Score: 24 (07/23/21 1501)  AM-PAC Inpatient ADL T-Scale Score : 57.54 (07/23/21 1501)  ADL Inpatient CMS 0-100% Score: 0 (07/23/21 1501)  ADL Inpatient CMS G-Code Modifier : CH (07/23/21 1501)    Therapy Time   Individual Concurrent Group Co-treatment   Time In 0946         Time Out 1002         Minutes 16         Timed Code Treatment Minutes: 8 Minutes       Lola Ellis OTR/L

## 2021-07-23 NOTE — PROGRESS NOTES
Per Dr. Karin Walls from IR, Pt is cleared for light activity. Pt notified. Will continue to monitor.

## 2021-07-23 NOTE — PROGRESS NOTES
Pt arrives to IR for LP  KP RT and  PA to bedside  Site prepped and draped  Per bedside RN banding has been drawn on floor    Access obtained and draining clear csf  Access removed and site covered with band aid  tolerated well  Return to floor  Specimen to lab    9ml removed

## 2021-07-23 NOTE — PROGRESS NOTES
Pt returned from LP with no complications. Pt vitals stable. Pt made aware that light activity is encouraged. Pt ambulated to the bathroom with assist. No concerns at this time. Will continue to monitor.      Electronically signed by Rosendo Ortega RN on 7/23/2021 at 12:48 PM

## 2021-07-24 LAB
MTHFR INTERPRETATION: NORMAL
MTHFR MUTATION A1286C: NORMAL
MTHFR MUTATION C665T: NEGATIVE
PROTHROMBIN G20210A MUTATION: NEGATIVE
PT PCR SPECIMEN: NORMAL
SPECIMEN: NORMAL

## 2021-07-25 LAB
CULTURE: NORMAL
CULTURE: NORMAL
FACTOR V MUTATION: NORMAL
Lab: NORMAL
Lab: NORMAL
PROTEIN S ANTIGEN, TOTAL: 106 % (ref 63–126)
SPECIMEN DESCRIPTION: NORMAL
SPECIMEN DESCRIPTION: NORMAL
SPECIMEN: NORMAL

## 2021-07-26 ENCOUNTER — CARE COORDINATION (OUTPATIENT)
Dept: CASE MANAGEMENT | Age: 56
End: 2021-07-26

## 2021-07-26 LAB
CSF ISOELECTRIC FOCUSING INTERPRETATION: ABNORMAL
OLIGOCLONAL BANDS NUMBER: 3 BANDS (ref 0–1)
OLIGOCLONAL BANDS: POSITIVE
PROTEIN C ANTIGEN: >95 % (ref 63–153)

## 2021-07-26 NOTE — CARE COORDINATION
Doug 45 Transitions Initial Follow Up Call    Call within 2 business days of discharge: Yes    Patient: Artis Rodriguez Patient : 1965   MRN: <[id number]>  Reason for Admission: -   Parietal lobe infarction [facility])  Discharge Date: 21 RARS: Readmission Risk Score: 10      Last Discharge [address] [address]       Complaint Diagnosis Description Type Department Provider    21   Admission (Discharged) Solitario Coelho MD    21 Altered Mental Status Altered mental status, unspecified altered mental status type . .. ED (TRANSFER) [facility] AT THE [facility] ED Kristi Edward DO           Spoke with: [phone]- 1st attempt. No answer. Left HIPPA compliant message to return call to this writer.     Facility: [address] [place] [address]    Non-face-to-face services provided:  Obtained and reviewed discharge summary and/or continuity of care documents    Care Transitions 24 Hour Call    Care Transitions Interventions         Follow Up  Future Appointments   Date Time Provider Tabatha Pope   10/7/2021  2:00 PM Laurel Cage MD Neuro [facility], [address] [place] [address] Care Transitions Nurse   [phone]
(4) excellent

## 2021-07-27 ENCOUNTER — CARE COORDINATION (OUTPATIENT)
Dept: CASE MANAGEMENT | Age: 56
End: 2021-07-27

## 2021-07-27 LAB
ANTICARDIOLIPIN IGA ANTIBODY: 3 APL (ref 0–14)
ANTICARDIOLIPIN IGG ANTIBODY: 2.1 GPL (ref 0–10)
CARDIOLIPIN AB IGM: 3.3 MPL (ref 0–10)
LYME ANTIBODY: 0.44

## 2021-07-27 NOTE — CARE COORDINATION
Doug 45 Transitions Initial Follow Up Call    Call within 2 business days of discharge: Yes    Patient: Gary Diaz Patient : 1965   MRN: <[id number]>  Reason for Admission: Parietal lobe infarction [facility])  TIA  Discharge Date: 21 RARS: Readmission Risk Score: 10      Last Discharge [facility]       Complaint Diagnosis Description Type Department Provider    21   Admission (Discharged) Solitario Coelho MD    21 Altered Mental Status Altered mental status, unspecified altered mental status type . .. ED (TRANSFER) [facility] ED Kit DO Krishna           Spoke with: 24 hour initial call. Second and final attempt. No answer. Left HIPPA compliant message to return call to this writer.      Facility: [address] [place] [address]    Non-face-to-face services provided:  Obtained and reviewed discharge summary and/or continuity of care documents    Care Transitions 24 Hour Call    Care Transitions Interventions         Follow Up  Future Appointments   Date Time Provider Tabatha Pope   10/7/2021  2:00 PM Alis Suarez MD Neuro [facility], [address] [place] [address] Care Transitions Nurse   [phone]

## 2021-07-27 NOTE — CARE COORDINATION
..Stroke Follow Up Phone Call    Called phone number on record for discharge follow up - No answer.      [x]Attempt #1    []Attempt #2 (final attempt)      Electronically signed by Jose Wu RN on 7/27/21 at 12:22 PM EDT

## 2021-07-28 NOTE — CARE COORDINATION
.Stroke Follow Up Phone Call    Called phone number on record - No answer. []Attempt #1    [x]Attempt #2 (final attempt) Computer generated answering machine pickup with no name given. No message left.      Electronically signed by Ciro Christensen RN on 7/28/21 at 10:30 AM EDT

## 2021-08-03 ENCOUNTER — TELEPHONE (OUTPATIENT)
Dept: NEUROLOGY | Age: 56
End: 2021-08-03